# Patient Record
Sex: MALE | Race: WHITE | Employment: OTHER | URBAN - METROPOLITAN AREA
[De-identification: names, ages, dates, MRNs, and addresses within clinical notes are randomized per-mention and may not be internally consistent; named-entity substitution may affect disease eponyms.]

---

## 2017-01-25 DIAGNOSIS — M72.0 CONTRACTURE OF PALMAR FASCIA: Primary | ICD-10-CM

## 2017-01-26 ENCOUNTER — TELEPHONE (OUTPATIENT)
Dept: ORTHOPEDIC SURGERY | Age: 82
End: 2017-01-26

## 2017-02-17 ENCOUNTER — OFFICE VISIT (OUTPATIENT)
Dept: INTERNAL MEDICINE CLINIC | Age: 82
End: 2017-02-17

## 2017-02-17 VITALS
OXYGEN SATURATION: 99 % | WEIGHT: 165 LBS | SYSTOLIC BLOOD PRESSURE: 142 MMHG | BODY MASS INDEX: 25.01 KG/M2 | HEART RATE: 82 BPM | HEIGHT: 68 IN | DIASTOLIC BLOOD PRESSURE: 76 MMHG | TEMPERATURE: 98 F

## 2017-02-17 DIAGNOSIS — E78.2 MIXED HYPERLIPIDEMIA: ICD-10-CM

## 2017-02-17 DIAGNOSIS — I10 ESSENTIAL HYPERTENSION, BENIGN: ICD-10-CM

## 2017-02-17 DIAGNOSIS — Z01.818 PRE-OP EXAMINATION: ICD-10-CM

## 2017-02-17 DIAGNOSIS — M72.0 DUPUYTREN CONTRACTURE: Primary | ICD-10-CM

## 2017-02-17 DIAGNOSIS — I25.10 CORONARY ARTERY DISEASE INVOLVING NATIVE CORONARY ARTERY OF NATIVE HEART WITHOUT ANGINA PECTORIS: ICD-10-CM

## 2017-02-17 DIAGNOSIS — N40.0 BENIGN PROSTATIC HYPERPLASIA, PRESENCE OF LOWER URINARY TRACT SYMPTOMS UNSPECIFIED, UNSPECIFIED MORPHOLOGY: ICD-10-CM

## 2017-02-17 PROCEDURE — 99214 OFFICE O/P EST MOD 30 MIN: CPT | Performed by: NURSE PRACTITIONER

## 2017-02-17 RX ORDER — CARVEDILOL 12.5 MG/1
TABLET ORAL
COMMUNITY
Start: 2016-12-27

## 2017-03-02 ENCOUNTER — HOSPITAL ENCOUNTER (OUTPATIENT)
Dept: SURGERY | Age: 82
Discharge: HOME | End: 2017-03-02
Attending: ORTHOPAEDIC SURGERY | Admitting: ORTHOPAEDIC SURGERY

## 2017-03-02 VITALS
HEART RATE: 60 BPM | TEMPERATURE: 97.4 F | BODY MASS INDEX: 25.01 KG/M2 | SYSTOLIC BLOOD PRESSURE: 128 MMHG | OXYGEN SATURATION: 100 % | HEIGHT: 68 IN | RESPIRATION RATE: 20 BRPM | DIASTOLIC BLOOD PRESSURE: 60 MMHG | WEIGHT: 165 LBS

## 2017-03-02 RX ORDER — SODIUM CHLORIDE 0.9 % (FLUSH) 0.9 %
10 SYRINGE (ML) INJECTION EVERY 12 HOURS SCHEDULED
Status: DISCONTINUED | OUTPATIENT
Start: 2017-03-02 | End: 2017-03-03 | Stop reason: HOSPADM

## 2017-03-02 RX ORDER — HYDROCODONE BITARTRATE AND ACETAMINOPHEN 5; 325 MG/1; MG/1
1 TABLET ORAL EVERY 8 HOURS PRN
Qty: 40 TABLET | Refills: 0 | Status: SHIPPED | OUTPATIENT
Start: 2017-03-02 | End: 2017-03-09

## 2017-03-02 RX ORDER — SODIUM CHLORIDE 0.9 % (FLUSH) 0.9 %
10 SYRINGE (ML) INJECTION PRN
Status: DISCONTINUED | OUTPATIENT
Start: 2017-03-02 | End: 2017-03-03 | Stop reason: HOSPADM

## 2017-03-02 RX ORDER — LIDOCAINE HYDROCHLORIDE 10 MG/ML
1 INJECTION, SOLUTION EPIDURAL; INFILTRATION; INTRACAUDAL; PERINEURAL
Status: ACTIVE | OUTPATIENT
Start: 2017-03-02 | End: 2017-03-02

## 2017-03-02 RX ORDER — SODIUM CHLORIDE, SODIUM LACTATE, POTASSIUM CHLORIDE, CALCIUM CHLORIDE 600; 310; 30; 20 MG/100ML; MG/100ML; MG/100ML; MG/100ML
INJECTION, SOLUTION INTRAVENOUS CONTINUOUS
Status: DISCONTINUED | OUTPATIENT
Start: 2017-03-02 | End: 2017-03-03 | Stop reason: HOSPADM

## 2017-03-02 RX ORDER — SODIUM CHLORIDE 9 MG/ML
INJECTION, SOLUTION INTRAVENOUS
Status: DISPENSED
Start: 2017-03-02 | End: 2017-03-02

## 2017-03-02 RX ADMIN — SODIUM CHLORIDE, SODIUM LACTATE, POTASSIUM CHLORIDE, CALCIUM CHLORIDE: 600; 310; 30; 20 INJECTION, SOLUTION INTRAVENOUS at 06:19

## 2017-03-02 ASSESSMENT — PAIN - FUNCTIONAL ASSESSMENT: PAIN_FUNCTIONAL_ASSESSMENT: 0-10

## 2017-03-02 ASSESSMENT — PAIN SCALES - GENERAL
PAINLEVEL_OUTOF10: 0

## 2017-03-06 ENCOUNTER — HOSPITAL ENCOUNTER (OUTPATIENT)
Dept: OCCUPATIONAL THERAPY | Age: 82
Discharge: HOME | End: 2017-02-28
Admitting: ORTHOPAEDIC SURGERY

## 2017-03-06 ENCOUNTER — HOSPITAL ENCOUNTER (OUTPATIENT)
Dept: OCCUPATIONAL THERAPY | Age: 82
Discharge: HOME | End: 2017-03-06
Admitting: ORTHOPAEDIC SURGERY

## 2017-03-10 ENCOUNTER — HOSPITAL ENCOUNTER (OUTPATIENT)
Dept: OCCUPATIONAL THERAPY | Age: 82
Discharge: HOME | End: 2017-03-10
Admitting: ORTHOPAEDIC SURGERY

## 2017-03-14 ENCOUNTER — OFFICE VISIT (OUTPATIENT)
Dept: ORTHOPEDIC SURGERY | Age: 82
End: 2017-03-14

## 2017-03-14 VITALS — BODY MASS INDEX: 25.88 KG/M2 | HEIGHT: 67 IN | WEIGHT: 164.9 LBS

## 2017-03-14 DIAGNOSIS — M72.0 DUPUYTREN'S CONTRACTURE OF LEFT HAND: Primary | ICD-10-CM

## 2017-03-14 PROCEDURE — 99024 POSTOP FOLLOW-UP VISIT: CPT | Performed by: ORTHOPAEDIC SURGERY

## 2017-03-16 ENCOUNTER — HOSPITAL ENCOUNTER (OUTPATIENT)
Dept: OCCUPATIONAL THERAPY | Age: 82
Discharge: HOME | End: 2017-03-16
Admitting: ORTHOPAEDIC SURGERY

## 2017-03-20 ENCOUNTER — TELEPHONE (OUTPATIENT)
Dept: ORTHOPEDIC SURGERY | Age: 82
End: 2017-03-20

## 2017-05-09 ENCOUNTER — OFFICE VISIT (OUTPATIENT)
Dept: ORTHOPEDIC SURGERY | Age: 82
End: 2017-05-09

## 2017-05-09 VITALS — BODY MASS INDEX: 25.88 KG/M2 | WEIGHT: 164.9 LBS | HEIGHT: 67 IN

## 2017-05-09 DIAGNOSIS — M72.0 DUPUYTREN'S CONTRACTURE OF LEFT HAND: Primary | ICD-10-CM

## 2017-05-09 DIAGNOSIS — M72.0 CONTRACTURE OF PALMAR FASCIA: ICD-10-CM

## 2017-05-09 PROCEDURE — 99024 POSTOP FOLLOW-UP VISIT: CPT | Performed by: ORTHOPAEDIC SURGERY

## 2017-09-18 ENCOUNTER — OFFICE VISIT (OUTPATIENT)
Dept: INTERNAL MEDICINE CLINIC | Age: 82
End: 2017-09-18

## 2017-09-18 VITALS
WEIGHT: 166 LBS | BODY MASS INDEX: 25.16 KG/M2 | HEIGHT: 68 IN | SYSTOLIC BLOOD PRESSURE: 146 MMHG | HEART RATE: 84 BPM | TEMPERATURE: 98.4 F | DIASTOLIC BLOOD PRESSURE: 72 MMHG | OXYGEN SATURATION: 98 %

## 2017-09-18 DIAGNOSIS — I25.10 CORONARY ARTERY DISEASE INVOLVING NATIVE CORONARY ARTERY OF NATIVE HEART WITHOUT ANGINA PECTORIS: ICD-10-CM

## 2017-09-18 DIAGNOSIS — Z23 NEED FOR INFLUENZA VACCINATION: ICD-10-CM

## 2017-09-18 DIAGNOSIS — Z00.00 ROUTINE GENERAL MEDICAL EXAMINATION AT A HEALTH CARE FACILITY: Primary | ICD-10-CM

## 2017-09-18 DIAGNOSIS — N40.0 BENIGN PROSTATIC HYPERPLASIA, PRESENCE OF LOWER URINARY TRACT SYMPTOMS UNSPECIFIED, UNSPECIFIED MORPHOLOGY: ICD-10-CM

## 2017-09-18 DIAGNOSIS — I10 ESSENTIAL HYPERTENSION, BENIGN: ICD-10-CM

## 2017-09-18 DIAGNOSIS — E78.2 MIXED HYPERLIPIDEMIA: ICD-10-CM

## 2017-09-18 PROCEDURE — 90662 IIV NO PRSV INCREASED AG IM: CPT | Performed by: NURSE PRACTITIONER

## 2017-09-18 PROCEDURE — G0008 ADMIN INFLUENZA VIRUS VAC: HCPCS | Performed by: NURSE PRACTITIONER

## 2017-09-18 PROCEDURE — G0439 PPPS, SUBSEQ VISIT: HCPCS | Performed by: NURSE PRACTITIONER

## 2017-09-18 ASSESSMENT — PATIENT HEALTH QUESTIONNAIRE - PHQ9: SUM OF ALL RESPONSES TO PHQ QUESTIONS 1-9: 0

## 2017-09-18 ASSESSMENT — ANXIETY QUESTIONNAIRES: GAD7 TOTAL SCORE: 0

## 2017-09-18 ASSESSMENT — LIFESTYLE VARIABLES: HOW OFTEN DO YOU HAVE A DRINK CONTAINING ALCOHOL: 0

## 2017-11-01 ENCOUNTER — HOSPITAL ENCOUNTER (OUTPATIENT)
Dept: OTHER | Age: 82
Discharge: HOME | End: 2017-11-30
Attending: INTERNAL MEDICINE | Admitting: INTERNAL MEDICINE

## 2017-12-01 ENCOUNTER — HOSPITAL ENCOUNTER (OUTPATIENT)
Dept: OTHER | Age: 82
Discharge: HOME | End: 2017-12-31
Attending: INTERNAL MEDICINE | Admitting: INTERNAL MEDICINE

## 2018-01-01 ENCOUNTER — HOSPITAL ENCOUNTER (OUTPATIENT)
Dept: OTHER | Age: 83
Discharge: HOME | End: 2018-01-31
Attending: INTERNAL MEDICINE | Admitting: INTERNAL MEDICINE

## 2018-01-09 ENCOUNTER — TELEPHONE (OUTPATIENT)
Dept: INTERNAL MEDICINE CLINIC | Age: 83
End: 2018-01-09

## 2018-01-09 NOTE — TELEPHONE ENCOUNTER
Patient started having double vision 2 days ago -it only happens when both eyes are open. His fingertips are also tingly on left hand, and right hand only one finger is. he does't complain of dizziness. .no chest or head congestion, no fever, ear pain -no cough -  Please advise  [phone]

## 2018-01-22 ENCOUNTER — OFFICE VISIT (OUTPATIENT)
Dept: INTERNAL MEDICINE CLINIC | Age: 83
End: 2018-01-22

## 2018-01-22 DIAGNOSIS — J10.1 INFLUENZA A: Primary | ICD-10-CM

## 2018-01-22 DIAGNOSIS — H53.2 DIPLOPIA: ICD-10-CM

## 2018-01-22 DIAGNOSIS — H49.23 SIXTH NERVE PALSY OF BOTH EYES: ICD-10-CM

## 2018-01-22 PROCEDURE — 99213 OFFICE O/P EST LOW 20 MIN: CPT | Performed by: NURSE PRACTITIONER

## 2018-01-22 NOTE — PROGRESS NOTES
place, and time. No cranial nerve deficit. Skin: He is not diaphoretic. Psychiatric: He has a normal mood and affect. His behavior is normal. Thought content normal.       Assessment:      1. Influenza A  Resolved, Doing well    2. Diplopia  Cont FU Ophthalmology     3. Sixth nerve palsy of both eyes  Cont FU Ophthalmology          Plan:      See above plan    If any symptoms worsen, call office immediately     Return if symptoms worsen or fail to improve.

## 2018-01-23 VITALS
BODY MASS INDEX: 25.09 KG/M2 | HEART RATE: 75 BPM | DIASTOLIC BLOOD PRESSURE: 82 MMHG | OXYGEN SATURATION: 99 % | SYSTOLIC BLOOD PRESSURE: 138 MMHG | WEIGHT: 165 LBS

## 2018-01-23 ASSESSMENT — ENCOUNTER SYMPTOMS
VOMITING: 0
DIARRHEA: 0
SINUS PRESSURE: 0
SORE THROAT: 0
NAUSEA: 0
COUGH: 0
FACIAL SWELLING: 0

## 2018-02-01 ENCOUNTER — HOSPITAL ENCOUNTER (OUTPATIENT)
Dept: OTHER | Age: 83
Discharge: HOME | End: 2018-02-28
Attending: INTERNAL MEDICINE | Admitting: INTERNAL MEDICINE

## 2018-03-01 ENCOUNTER — HOSPITAL ENCOUNTER (OUTPATIENT)
Dept: OTHER | Age: 83
Discharge: HOME | End: 2018-03-31
Attending: INTERNAL MEDICINE | Admitting: INTERNAL MEDICINE

## 2018-04-01 ENCOUNTER — HOSPITAL ENCOUNTER (OUTPATIENT)
Dept: OTHER | Age: 83
Discharge: HOME | End: 2018-04-30
Attending: INTERNAL MEDICINE | Admitting: INTERNAL MEDICINE

## 2018-05-01 ENCOUNTER — HOSPITAL ENCOUNTER (OUTPATIENT)
Dept: OTHER | Age: 83
Discharge: HOME | End: 2018-05-31
Attending: INTERNAL MEDICINE | Admitting: INTERNAL MEDICINE

## 2018-06-01 ENCOUNTER — HOSPITAL ENCOUNTER (OUTPATIENT)
Dept: OTHER | Age: 83
Discharge: HOME | End: 2018-06-30
Attending: INTERNAL MEDICINE | Admitting: INTERNAL MEDICINE

## 2018-07-01 ENCOUNTER — HOSPITAL ENCOUNTER (OUTPATIENT)
Dept: OTHER | Age: 83
Discharge: HOME | End: 2018-07-01
Attending: INTERNAL MEDICINE | Admitting: INTERNAL MEDICINE

## 2018-08-14 ENCOUNTER — HOSPITAL ENCOUNTER (OUTPATIENT)
Age: 83
Discharge: HOME | End: 2018-08-14
Payer: MEDICARE

## 2018-08-14 PROCEDURE — 9900000038 HC CARDIAC REHAB PHASE 3 - MONTHLY

## 2018-09-12 ENCOUNTER — HOSPITAL ENCOUNTER (OUTPATIENT)
Age: 83
Discharge: HOME | End: 2018-09-12
Payer: MEDICARE

## 2018-09-12 PROCEDURE — 9900000038 HC CARDIAC REHAB PHASE 3 - MONTHLY

## 2018-09-24 ENCOUNTER — OFFICE VISIT (OUTPATIENT)
Dept: INTERNAL MEDICINE CLINIC | Age: 83
End: 2018-09-24
Payer: MEDICARE

## 2018-09-24 VITALS
BODY MASS INDEX: 25.9 KG/M2 | DIASTOLIC BLOOD PRESSURE: 62 MMHG | HEART RATE: 72 BPM | SYSTOLIC BLOOD PRESSURE: 128 MMHG | OXYGEN SATURATION: 98 % | WEIGHT: 165 LBS | HEIGHT: 67 IN

## 2018-09-24 DIAGNOSIS — Z23 NEED FOR PROPHYLACTIC VACCINATION AND INOCULATION AGAINST VARICELLA: ICD-10-CM

## 2018-09-24 DIAGNOSIS — Z00.00 ROUTINE GENERAL MEDICAL EXAMINATION AT A HEALTH CARE FACILITY: Primary | ICD-10-CM

## 2018-09-24 DIAGNOSIS — Z23 NEED FOR PROPHYLACTIC VACCINATION WITH TETANUS-DIPHTHERIA (TD): ICD-10-CM

## 2018-09-24 PROCEDURE — G0439 PPPS, SUBSEQ VISIT: HCPCS | Performed by: NURSE PRACTITIONER

## 2018-09-24 RX ORDER — TETANUS AND DIPHTHERIA TOXOIDS ADSORBED 2; 2 [LF]/.5ML; [LF]/.5ML
0.5 INJECTION INTRAMUSCULAR ONCE
Qty: 0.5 ML | Refills: 0 | Status: SHIPPED | OUTPATIENT
Start: 2018-09-24 | End: 2018-09-24

## 2018-09-24 ASSESSMENT — LIFESTYLE VARIABLES
HOW MANY STANDARD DRINKS CONTAINING ALCOHOL DO YOU HAVE ON A TYPICAL DAY: 0
HOW OFTEN DURING THE LAST YEAR HAVE YOU BEEN UNABLE TO REMEMBER WHAT HAPPENED THE NIGHT BEFORE BECAUSE YOU HAD BEEN DRINKING: 0
AUDIT-C TOTAL SCORE: 0
HOW OFTEN DURING THE LAST YEAR HAVE YOU HAD A FEELING OF GUILT OR REMORSE AFTER DRINKING: 0
HOW OFTEN DO YOU HAVE A DRINK CONTAINING ALCOHOL: 0
HAS A RELATIVE, FRIEND, DOCTOR, OR ANOTHER HEALTH PROFESSIONAL EXPRESSED CONCERN ABOUT YOUR DRINKING OR SUGGESTED YOU CUT DOWN: 0
HOW OFTEN DURING THE LAST YEAR HAVE YOU FOUND THAT YOU WERE NOT ABLE TO STOP DRINKING ONCE YOU HAD STARTED: 0
HOW OFTEN DURING THE LAST YEAR HAVE YOU FAILED TO DO WHAT WAS NORMALLY EXPECTED FROM YOU BECAUSE OF DRINKING: 0
HOW OFTEN DURING THE LAST YEAR HAVE YOU NEEDED AN ALCOHOLIC DRINK FIRST THING IN THE MORNING TO GET YOURSELF GOING AFTER A NIGHT OF HEAVY DRINKING: 0
HOW OFTEN DO YOU HAVE SIX OR MORE DRINKS ON ONE OCCASION: 0
HAVE YOU OR SOMEONE ELSE BEEN INJURED AS A RESULT OF YOUR DRINKING: 0
AUDIT TOTAL SCORE: 0

## 2018-09-24 ASSESSMENT — PATIENT HEALTH QUESTIONNAIRE - PHQ9
SUM OF ALL RESPONSES TO PHQ QUESTIONS 1-9: 0
SUM OF ALL RESPONSES TO PHQ QUESTIONS 1-9: 0

## 2018-09-24 ASSESSMENT — ANXIETY QUESTIONNAIRES: GAD7 TOTAL SCORE: 0

## 2018-09-24 NOTE — PROGRESS NOTES
02/27/90 05/20/05    COLONOSCOPY  7/28/15    polyp    CORONARY ANGIOPLASTY WITH STENT PLACEMENT  2010 2013    2010, 2013 x 2 stents    HAND SURGERY Left 03/02/2017    EXCISE DUPUYTRENS DISEASE LEFT HAND AND LEFT SMALL FINGER    OTHER SURGICAL HISTORY  1955    Anorectal fistula     Family History   Problem Relation Age of Onset    Cancer Mother         unknown     Heart Disease Father     Cancer Brother         Lung       CareTeam (Including outside providers/suppliers regularly involved in providing care):   Patient Care Team:  Nitza Rebolledo MD as PCP - General  MELECIO Morales CNP as PCP - S Attributed Provider  Yonny Lewis as Consulting Physician (Urology)  Diana Pelaez MD as Consulting Physician (Cardiology)  Umesh Tolliver MD as Surgeon (Orthopedic Surgery)    Wt Readings from Last 3 Encounters:   09/24/18 165 lb (74.8 kg)   01/22/18 165 lb (74.8 kg)   01/09/18 165 lb (74.8 kg)     Vitals:    09/24/18 1012   BP: 128/62   Site: Left Upper Arm   Position: Sitting   Cuff Size: Large Adult   Pulse: 72   SpO2: 98%   Weight: 165 lb (74.8 kg)   Height: 5' 7.25\" (1.708 m)     Body mass index is 25.65 kg/m².     General Appearance: alert and oriented to person, place and time, well developed and well- nourished, in no acute distress  Skin: warm and dry, no rash or erythema  Head: normocephalic and atraumatic  Eyes: pupils equal, round, and reactive to light, extraocular eye movements intact, conjunctivae normal  ENT: tympanic membrane, external ear and ear canal normal bilaterally, nose without deformity, nasal mucosa and turbinates normal without polyps  Neck: supple and non-tender without mass, no thyromegaly or thyroid nodules, no cervical lymphadenopathy  Pulmonary/Chest: clear to auscultation bilaterally- no wheezes, rales or rhonchi, normal air movement, no respiratory distress  Cardiovascular: normal rate, regular rhythm, normal S1 and S2, no murmurs, rubs, clicks, or gallops, distal pulses intact, no carotid bruits  Abdomen: soft, non-tender, non-distended, normal bowel sounds, no masses or organomegaly  Extremities: no cyanosis, clubbing or edema  Musculoskeletal: normal range of motion, no joint swelling, deformity or tenderness  Neurologic: reflexes normal and symmetric, no cranial nerve deficit, gait, coordination and speech normal    Patient's complete Health Risk Assessment and screening values have been reviewed and are found in Flowsheets. The following problems were reviewed today and where indicated follow up appointments were made and/or referrals ordered. Positive Risk Factor Screenings with Interventions:     No Positive Risk Factors identified today. Personalized Preventive Plan   Current Health Maintenance Status  Immunization History   Administered Date(s) Administered    Influenza Virus Vaccine 11/21/2008, 10/16/2009, 10/09/2010, 10/11/2011, 10/11/2013    Influenza, High Dose (Fluzone 65 yrs and older) 10/10/2014, 09/01/2015, 10/14/2016, 09/18/2017    Pneumococcal 13-valent Conjugate ([id number]) 09/12/2016    Pneumococcal Polysaccharide ([id number]) 03/26/2001, 08/09/2011    Tdap (Boostrix, Adacel) 07/20/2006    Zoster Live (Zostavax) 01/01/2010        Health Maintenance   Topic Date Due    Shingles Vaccine (1 of 2 - 2 Dose Series) 03/01/2010    DTaP/Tdap/Td vaccine (2 - Td) 07/20/2016    Flu vaccine (1) 09/01/2018    Potassium monitoring  01/09/2019    Creatinine monitoring  01/09/2019    Pneumococcal low/med risk  Completed     Recommendations for Preventive Services Due: see orders and patient instructions/AVS.  .   Recommended screening schedule for the next 5-10 years is provided to the patient in written form: see Patient Instructions/AVS.

## 2018-10-09 ENCOUNTER — HOSPITAL ENCOUNTER (OUTPATIENT)
Age: 83
Discharge: HOME | End: 2018-10-09
Payer: MEDICARE

## 2018-10-09 PROCEDURE — 9900000038 HC CARDIAC REHAB PHASE 3 - MONTHLY

## 2018-10-26 ENCOUNTER — NURSE ONLY (OUTPATIENT)
Dept: INTERNAL MEDICINE CLINIC | Age: 83
End: 2018-10-26
Payer: MEDICARE

## 2018-10-26 DIAGNOSIS — Z23 NEED FOR PROPHYLACTIC VACCINATION AND INOCULATION AGAINST INFLUENZA: Primary | ICD-10-CM

## 2018-10-26 PROCEDURE — 90662 IIV NO PRSV INCREASED AG IM: CPT | Performed by: FAMILY MEDICINE

## 2018-10-26 PROCEDURE — G0008 ADMIN INFLUENZA VIRUS VAC: HCPCS | Performed by: FAMILY MEDICINE

## 2018-11-06 ENCOUNTER — HOSPITAL ENCOUNTER (OUTPATIENT)
Age: 83
Discharge: HOME | End: 2018-11-06
Payer: MEDICARE

## 2018-11-06 PROCEDURE — 9900000038 HC CARDIAC REHAB PHASE 3 - MONTHLY

## 2018-12-11 ENCOUNTER — HOSPITAL ENCOUNTER (OUTPATIENT)
Age: 83
Discharge: HOME | End: 2018-12-11
Payer: MEDICARE

## 2018-12-11 PROCEDURE — 9900000038 HC CARDIAC REHAB PHASE 3 - MONTHLY

## 2019-01-08 ENCOUNTER — HOSPITAL ENCOUNTER (OUTPATIENT)
Age: 84
Discharge: HOME | End: 2019-01-08
Payer: MEDICARE

## 2019-01-08 PROCEDURE — 9900000038 HC CARDIAC REHAB PHASE 3 - MONTHLY

## 2019-02-12 ENCOUNTER — HOSPITAL ENCOUNTER (OUTPATIENT)
Age: 84
Discharge: HOME | End: 2019-02-12
Payer: MEDICARE

## 2019-02-12 PROCEDURE — 9900000038 HC CARDIAC REHAB PHASE 3 - MONTHLY

## 2019-03-05 ENCOUNTER — HOSPITAL ENCOUNTER (OUTPATIENT)
Age: 84
Discharge: HOME | End: 2019-03-05
Payer: MEDICARE

## 2019-03-05 PROCEDURE — 9900000038 HC CARDIAC REHAB PHASE 3 - MONTHLY

## 2019-04-03 ENCOUNTER — HOSPITAL ENCOUNTER (OUTPATIENT)
Age: 84
Discharge: HOME | End: 2019-04-03
Payer: MEDICARE

## 2019-04-03 PROCEDURE — 9900000038 HC CARDIAC REHAB PHASE 3 - MONTHLY

## 2019-04-05 ENCOUNTER — OFFICE VISIT (OUTPATIENT)
Dept: INTERNAL MEDICINE CLINIC | Age: 84
End: 2019-04-05
Payer: MEDICARE

## 2019-04-05 VITALS
HEART RATE: 64 BPM | BODY MASS INDEX: 26.12 KG/M2 | DIASTOLIC BLOOD PRESSURE: 66 MMHG | SYSTOLIC BLOOD PRESSURE: 132 MMHG | WEIGHT: 168 LBS | OXYGEN SATURATION: 98 %

## 2019-04-05 DIAGNOSIS — H25.9 SENILE CATARACT OF LEFT EYE, UNSPECIFIED AGE-RELATED CATARACT TYPE: Primary | ICD-10-CM

## 2019-04-05 DIAGNOSIS — Z01.818 PRE-OP EXAMINATION: ICD-10-CM

## 2019-04-05 DIAGNOSIS — E78.2 MIXED HYPERLIPIDEMIA: ICD-10-CM

## 2019-04-05 DIAGNOSIS — I10 ESSENTIAL HYPERTENSION, BENIGN: ICD-10-CM

## 2019-04-05 DIAGNOSIS — I25.10 CORONARY ARTERY DISEASE INVOLVING NATIVE CORONARY ARTERY OF NATIVE HEART WITHOUT ANGINA PECTORIS: ICD-10-CM

## 2019-04-05 PROCEDURE — 99214 OFFICE O/P EST MOD 30 MIN: CPT | Performed by: NURSE PRACTITIONER

## 2019-04-05 NOTE — PROGRESS NOTES
Preoperative Consultation      Farhat Wu  YOB: 1935    Date of Service:  4/5/2019    Vitals:    04/05/19 1408   BP: 132/66   Site: Left Upper Arm   Position: Sitting   Cuff Size: Large Adult   Pulse: 64   SpO2: 98%   Weight: 168 lb (76.2 kg)      Wt Readings from Last 2 Encounters:   04/05/19 168 lb (76.2 kg)   09/24/18 165 lb (74.8 kg)     BP Readings from Last 3 Encounters:   04/05/19 132/66   09/24/18 128/62   01/23/18 138/82        Chief Complaint   Patient presents with   Sue Ocampo Pre-op Exam     L cataract / Severa Fly: 4-16-19//[phone]     Allergies   Allergen Reactions    Levaquin  [Levofloxacin In D5w]      Leg pain     Outpatient Medications Marked as Taking for the 4/5/19 encounter (Office Visit) with MELECIO Shankar - CNP   Medication Sig Dispense Refill    Multiple Vitamins-Minerals (MULTIVITAMIN PO) Take by mouth daily      Multiple Vitamins-Minerals (PRESERVISION AREDS PO) Take by mouth daily      Calcium Citrate-Vitamin D (CITRACAL + D PO) Take by mouth daily      carvedilol (COREG) 12.5 MG tablet TAKE ONE TABLET BY MOUTH TWICE A DAY WITH FOOD      amLODIPine (NORVASC) 5 MG tablet Take 1 tablet by mouth daily. 30 tablet 0    tadalafil (CIALIS) 10 MG tablet Take 10 mg by mouth every 48 hours Indications: BPH For BPH      aspirin 81 MG tablet Take 1 tablet by mouth daily. 30 tablet     lisinopril (PRINIVIL;ZESTRIL) 20 MG tablet Take 20 mg by mouth daily.  atorvastatin (LIPITOR) 40 MG tablet Take 40 mg by mouth daily.  MULTIPLE VITAMIN MT Take  by mouth. This patient presents to the office today for a preoperative consultation at the request of surgeon, Dr. Joshua Awad, who plans on performing Lt cataract repair on April 16 at [facility]. The current problem began 3 years ago, and symptoms have been worsening with time.   Conservative therapy: No.    Planned anesthesia: Local and MAC   Known anesthesia problems: None   Bleeding risk: No recent or remote history of abnormal bleeding  Personal or FH of DVT/PE: No    Patient objection to receiving blood products: No    Patient Active Problem List   Diagnosis    RBBB    History of PTCA    CAD (coronary artery disease)    Vertigo    Essential hypertension, benign    Mixed hyperlipidemia    Back pain    Malignant hypertension       Past Medical History:   Diagnosis Date    BPH (benign prostatic hyperplasia)     CAD (coronary artery disease)     Colon polyps 1990    hyperplastic    History of PTCA     Hyperlipidemia     Hypertension     Influenza A 01/09/2018    RBBB     Vertigo      Past Surgical History:   Procedure Laterality Date    COLONOSCOPY  02/27/90 05/20/05    COLONOSCOPY  7/28/15    polyp    CORONARY ANGIOPLASTY WITH STENT PLACEMENT  2010 2013 2010, 2013 x 2 stents    HAND SURGERY Left 03/02/2017    EXCISE DUPUYTRENS DISEASE LEFT HAND AND LEFT SMALL FINGER    OTHER SURGICAL HISTORY  1955    Anorectal fistula     Family History   Problem Relation Age of Onset    Cancer Mother         unknown     Heart Disease Father     Cancer Brother         Lung     Social History     Socioeconomic History    Marital status:      Spouse name: Not on file    Number of children: Not on file    Years of education: Not on file    Highest education level: Not on file   Occupational History    Not on file   Social Needs    Financial resource strain: Not on file    Food insecurity:     Worry: Not on file     Inability: Not on file    Transportation needs:     Medical: Not on file     Non-medical: Not on file   Tobacco Use    Smoking status: Never Smoker    Smokeless tobacco: Never Used   Substance and Sexual Activity    Alcohol use: Yes     Comment: rare glass of wine    Drug use: No    Sexual activity: Not on file   Lifestyle    Physical activity:     Days per week: Not on file     Minutes per session: Not on file    Stress: Not on file   Relationships    Social connections: Talks on phone: Not on file     Gets together: Not on file     Attends Jainism service: Not on file     Active member of club or organization: Not on file     Attends meetings of clubs or organizations: Not on file     Relationship status: Not on file    Intimate partner violence:     Fear of current or ex partner: Not on file     Emotionally abused: Not on file     Physically abused: Not on file     Forced sexual activity: Not on file   Other Topics Concern    Not on file   Social History Narrative    Not on file       Review of Systems  All other systems were reviewed and are negative. Physical Exam   Constitutional: He is oriented to person, place, and time. He appears well-developed and well-nourished. No distress. HENT:   Head: Normocephalic and atraumatic. Mouth/Throat: Uvula is midline, oropharynx is clear and moist and mucous membranes are normal.   Eyes: Conjunctivae and EOM are normal. Pupils are equal, round, and reactive to light. Neck: Trachea normal and normal range of motion. Neck supple. No JVD present. Carotid bruit is not present. No mass and no thyromegaly present. Cardiovascular: Normal rate, regular rhythm, normal heart sounds and intact distal pulses. Exam reveals no gallop and no friction rub. No murmur heard. Pulmonary/Chest: Effort normal and breath sounds normal. No respiratory distress. He has no wheezes. He has no rales. Abdominal: Soft. Normal aorta and bowel sounds are normal. He exhibits no distension and no mass. There is no hepatosplenomegaly. No tenderness. Musculoskeletal: He exhibits no edema and no tenderness. Neurological: He is alert and oriented to person, place, and time. He has normal strength. No cranial nerve deficit or sensory deficit. Coordination and gait normal.   Skin: Skin is warm and dry. No rash noted. No erythema. Psychiatric: He has a normal mood and affect.  His behavior is normal.     EKG Interpretation:  Dr Jannet Sun - cardiac clearance, ok to stop ASA 7 days prior. Lab Review not applicable        Assessment:       80 y.o. patient with planned surgery as above. Known risk factors for perioperative complications: Coronary artery disease, Hypertension  Current medications which may produce withdrawal symptoms if withheld perioperatively: none     1. Senile cataract of left eye, unspecified age-related cataract type  Cleared for surgery    2. Pre-op examination  Cleared for surgery    3. Coronary artery disease involving native coronary artery of native heart without angina pectoris  Monitor    4. Essential hypertension, benign  Monitor    5. Mixed hyperlipidemia  Monitor       Plan:     1. Preoperative workup as follows: Cardiac clearance  2. Change in medication regimen before surgery: Take Carvedilol / Lisinopril on morning of surgery with sip of water, and hold all other medications until after surgery, Discontinue ASA 7 days before surgery, Discontinue NSAIDs (Ibuprofen, Advil, Aleve, Motrin) 7 days before surgery, Ok to take Tylenol prior to surgery. 3. Prophylaxis for cardiac events with perioperative beta-blockers: Currently taking  carvedilol  ACC/AHA indications for pre-operative beta-blocker use:    · Vascular surgery with history of postitive stress test  · Intermediate or high risk surgery with history of CAD   · Intermediate or high risk surgery with multiple clinical predictors of CAD- 2 of the following: history of compensated or prior heart failure, history of cerebrovascular disease, DM, or renal insufficiency    Routine administration of higher-dose, long-acting metoprolol in beta-blocker-naïve patients on the day of surgery, and in the absence of dose titration is associated with an overall increase in mortality. Beta-blockers should be started days to weeks prior to surgery and titrated to pulse < 70.  4. Deep vein thrombosis prophylaxis: regimen to be chosen by surgical team  5.  No contraindications to planned surgery            Cleared for surgery

## 2019-04-08 NOTE — PROGRESS NOTES
Glenora Puna    Age 80 y.o.    male    1935    MRN [id number]    Date___________   Arrival Time_____________  OR Time____________Duration____     Procedure(s):  PHACOEMULSIFICATION OF CATARACT LEFT EYE WITH INTRAOCULAR LENS IMPLANT    Surgeon  ________________________________  Cherre Dyana   General   [place]       Phone [phone] (home)       [address]  Cell Work  ______________________________________________________________________________________________________________________________________________________________________________________________________________________________________________________________________________________________________________________________________________________________    PCP__________________________Phone__________________________________      H&P__________________Bringing      Chart              Epic    DOS           Called_______  EKG__________________Bringing      Chart              Epic    DOS           Called_______  LAB__________________ Bringing      Chart              Epic    DOS           Called_______  CardiacClearance _______Bringing      Chart              Epic    DOS           Called_______      Cardiologist________________________ Phone___________________________      ? Gnosticism concerns / Waiver on Chart            [employer]________________  ? Pre-op Instructions Given [place]          _________________________________  ? Directions to Surgery Center                          _________________________________  ? Transportation Home_______________      _________________________________  ?  Crutches/Walker__________________        _________________________________      ________Pre-op Orders   _______Transcribed    _______Wt.  ________Pharmacy          _______SCD  ______VTE     ______Beta Blocker  ________Consent

## 2019-04-11 NOTE — PROGRESS NOTES
Obstructive Sleep Apnea (JASMINE) Screening     Patient:  Kirt Mckeon    YOB: 1935      Medical Record #:  [id number]                     Date:  4/11/2019     1. Are you a loud and/or regular snorer? []  Yes       [x] No    2. Have you been observed to gasp or stop breathing during sleep? []  Yes       [x] No    3. Do you feel tired or groggy upon awakening or do you awaken with a headache?           []  Yes       [x] No    4. Are you often tired or fatigued during the wake time hours? []  Yes       [x] No    5. Do you fall asleep sitting, reading, watching TV or driving? []  Yes       [x] No    6. Do you often have problems with memory or concentration? []  Yes       [x] No    **If patient's score is ? 3 they are considered high risk for JASMINE. Notify the anesthesiologist of the high risk and document in focus note. Note:  If the patient's BMI is more than 35 kg m¯² , has neck circumference > 40 cm, and/or high blood pressure the risk is greater (© American Sleep Apnea Association, 2006).

## 2019-04-16 ENCOUNTER — HOSPITAL ENCOUNTER (OUTPATIENT)
Age: 84
Setting detail: OUTPATIENT SURGERY
Discharge: HOME | End: 2019-04-16
Attending: OPHTHALMOLOGY | Admitting: OPHTHALMOLOGY
Payer: MEDICARE

## 2019-04-16 ENCOUNTER — ANESTHESIA (OUTPATIENT)
Dept: OPERATING ROOM | Age: 84
End: 2019-04-16
Payer: MEDICARE

## 2019-04-16 ENCOUNTER — ANESTHESIA EVENT (OUTPATIENT)
Dept: OPERATING ROOM | Age: 84
End: 2019-04-16
Payer: MEDICARE

## 2019-04-16 VITALS
TEMPERATURE: 97.2 F | DIASTOLIC BLOOD PRESSURE: 66 MMHG | SYSTOLIC BLOOD PRESSURE: 140 MMHG | RESPIRATION RATE: 16 BRPM | BODY MASS INDEX: 25.16 KG/M2 | WEIGHT: 166 LBS | OXYGEN SATURATION: 96 % | HEIGHT: 68 IN | HEART RATE: 48 BPM

## 2019-04-16 VITALS — SYSTOLIC BLOOD PRESSURE: 140 MMHG | OXYGEN SATURATION: 100 % | DIASTOLIC BLOOD PRESSURE: 70 MMHG

## 2019-04-16 PROCEDURE — 3600000013 HC SURGERY LEVEL 3 ADDTL 15MIN: Performed by: OPHTHALMOLOGY

## 2019-04-16 PROCEDURE — 6360000002 HC RX W HCPCS: Performed by: NURSE ANESTHETIST, CERTIFIED REGISTERED

## 2019-04-16 PROCEDURE — 3700000000 HC ANESTHESIA ATTENDED CARE: Performed by: OPHTHALMOLOGY

## 2019-04-16 PROCEDURE — 7100000010 HC PHASE II RECOVERY - FIRST 15 MIN: Performed by: OPHTHALMOLOGY

## 2019-04-16 PROCEDURE — 6360000002 HC RX W HCPCS: Performed by: OPHTHALMOLOGY

## 2019-04-16 PROCEDURE — 2580000003 HC RX 258: Performed by: OPHTHALMOLOGY

## 2019-04-16 PROCEDURE — 2709999900 HC NON-CHARGEABLE SUPPLY: Performed by: OPHTHALMOLOGY

## 2019-04-16 PROCEDURE — V2632 POST CHMBR INTRAOCULAR LENS: HCPCS | Performed by: OPHTHALMOLOGY

## 2019-04-16 PROCEDURE — 6370000000 HC RX 637 (ALT 250 FOR IP): Performed by: OPHTHALMOLOGY

## 2019-04-16 PROCEDURE — 2580000003 HC RX 258: Performed by: ANESTHESIOLOGY

## 2019-04-16 PROCEDURE — 2500000003 HC RX 250 WO HCPCS: Performed by: NURSE ANESTHETIST, CERTIFIED REGISTERED

## 2019-04-16 PROCEDURE — 3600000003 HC SURGERY LEVEL 3 BASE: Performed by: OPHTHALMOLOGY

## 2019-04-16 PROCEDURE — 2500000003 HC RX 250 WO HCPCS: Performed by: OPHTHALMOLOGY

## 2019-04-16 PROCEDURE — 3700000001 HC ADD 15 MINUTES (ANESTHESIA): Performed by: OPHTHALMOLOGY

## 2019-04-16 PROCEDURE — 7100000011 HC PHASE II RECOVERY - ADDTL 15 MIN: Performed by: OPHTHALMOLOGY

## 2019-04-16 DEVICE — ACRYSOF(R) SINGLE-PIECE ACRYLIC FOLDABLE IOL,UV-ABSORBING POSTERIOR CHAMBER LENS (IOL/PC),13.0MM LENGTH, 6.0MM BICONVEX OPTIC, PLANARHAPTICS.
Type: IMPLANTABLE DEVICE | Site: EYE | Status: FUNCTIONAL
Brand: ACRYSOF®

## 2019-04-16 RX ORDER — TOBRAMYCIN AND DEXAMETHASONE 3; 1 MG/ML; MG/ML
SUSPENSION/ DROPS OPHTHALMIC PRN
Status: DISCONTINUED | OUTPATIENT
Start: 2019-04-16 | End: 2019-04-16 | Stop reason: ALTCHOICE

## 2019-04-16 RX ORDER — PROMETHAZINE HYDROCHLORIDE 25 MG/ML
6.25 INJECTION, SOLUTION INTRAMUSCULAR; INTRAVENOUS
Status: DISCONTINUED | OUTPATIENT
Start: 2019-04-16 | End: 2019-04-16 | Stop reason: HOSPADM

## 2019-04-16 RX ORDER — DIPHENHYDRAMINE HYDROCHLORIDE 50 MG/ML
12.5 INJECTION INTRAMUSCULAR; INTRAVENOUS
Status: DISCONTINUED | OUTPATIENT
Start: 2019-04-16 | End: 2019-04-16 | Stop reason: HOSPADM

## 2019-04-16 RX ORDER — MORPHINE SULFATE 2 MG/ML
1 INJECTION, SOLUTION INTRAMUSCULAR; INTRAVENOUS EVERY 5 MIN PRN
Status: DISCONTINUED | OUTPATIENT
Start: 2019-04-16 | End: 2019-04-16 | Stop reason: HOSPADM

## 2019-04-16 RX ORDER — CYCLOPENTOLATE HYDROCHLORIDE 10 MG/ML
1 SOLUTION/ DROPS OPHTHALMIC
Status: COMPLETED | OUTPATIENT
Start: 2019-04-16 | End: 2019-04-16

## 2019-04-16 RX ORDER — OXYCODONE HYDROCHLORIDE AND ACETAMINOPHEN 5; 325 MG/1; MG/1
2 TABLET ORAL PRN
Status: DISCONTINUED | OUTPATIENT
Start: 2019-04-16 | End: 2019-04-16 | Stop reason: HOSPADM

## 2019-04-16 RX ORDER — MEPERIDINE HYDROCHLORIDE 50 MG/ML
12.5 INJECTION INTRAMUSCULAR; INTRAVENOUS; SUBCUTANEOUS EVERY 5 MIN PRN
Status: DISCONTINUED | OUTPATIENT
Start: 2019-04-16 | End: 2019-04-16 | Stop reason: HOSPADM

## 2019-04-16 RX ORDER — LABETALOL HYDROCHLORIDE 5 MG/ML
5 INJECTION, SOLUTION INTRAVENOUS EVERY 10 MIN PRN
Status: DISCONTINUED | OUTPATIENT
Start: 2019-04-16 | End: 2019-04-16 | Stop reason: HOSPADM

## 2019-04-16 RX ORDER — LIDOCAINE HYDROCHLORIDE 10 MG/ML
0.3 INJECTION, SOLUTION EPIDURAL; INFILTRATION; INTRACAUDAL; PERINEURAL
Status: DISCONTINUED | OUTPATIENT
Start: 2019-04-16 | End: 2019-04-16 | Stop reason: HOSPADM

## 2019-04-16 RX ORDER — SODIUM CHLORIDE 0.9 % (FLUSH) 0.9 %
10 SYRINGE (ML) INJECTION PRN
Status: DISCONTINUED | OUTPATIENT
Start: 2019-04-16 | End: 2019-04-16 | Stop reason: HOSPADM

## 2019-04-16 RX ORDER — HYDRALAZINE HYDROCHLORIDE 20 MG/ML
5 INJECTION INTRAMUSCULAR; INTRAVENOUS EVERY 10 MIN PRN
Status: DISCONTINUED | OUTPATIENT
Start: 2019-04-16 | End: 2019-04-16 | Stop reason: HOSPADM

## 2019-04-16 RX ORDER — OXYCODONE HYDROCHLORIDE AND ACETAMINOPHEN 5; 325 MG/1; MG/1
1 TABLET ORAL PRN
Status: DISCONTINUED | OUTPATIENT
Start: 2019-04-16 | End: 2019-04-16 | Stop reason: HOSPADM

## 2019-04-16 RX ORDER — SODIUM CHLORIDE 0.9 % (FLUSH) 0.9 %
10 SYRINGE (ML) INJECTION EVERY 12 HOURS SCHEDULED
Status: DISCONTINUED | OUTPATIENT
Start: 2019-04-16 | End: 2019-04-16 | Stop reason: HOSPADM

## 2019-04-16 RX ORDER — MORPHINE SULFATE 2 MG/ML
2 INJECTION, SOLUTION INTRAMUSCULAR; INTRAVENOUS EVERY 5 MIN PRN
Status: DISCONTINUED | OUTPATIENT
Start: 2019-04-16 | End: 2019-04-16 | Stop reason: HOSPADM

## 2019-04-16 RX ORDER — ONDANSETRON 2 MG/ML
4 INJECTION INTRAMUSCULAR; INTRAVENOUS
Status: DISCONTINUED | OUTPATIENT
Start: 2019-04-16 | End: 2019-04-16 | Stop reason: HOSPADM

## 2019-04-16 RX ORDER — PHENYLEPHRINE HCL 2.5 %
1 DROPS OPHTHALMIC (EYE)
Status: COMPLETED | OUTPATIENT
Start: 2019-04-16 | End: 2019-04-16

## 2019-04-16 RX ORDER — LIDOCAINE HYDROCHLORIDE 20 MG/ML
INJECTION, SOLUTION INFILTRATION; PERINEURAL PRN
Status: DISCONTINUED | OUTPATIENT
Start: 2019-04-16 | End: 2019-04-16 | Stop reason: SDUPTHER

## 2019-04-16 RX ORDER — MAGNESIUM HYDROXIDE 1200 MG/15ML
LIQUID ORAL PRN
Status: DISCONTINUED | OUTPATIENT
Start: 2019-04-16 | End: 2019-04-16 | Stop reason: ALTCHOICE

## 2019-04-16 RX ORDER — SODIUM CHLORIDE, SODIUM LACTATE, POTASSIUM CHLORIDE, CALCIUM CHLORIDE 600; 310; 30; 20 MG/100ML; MG/100ML; MG/100ML; MG/100ML
INJECTION, SOLUTION INTRAVENOUS CONTINUOUS
Status: DISCONTINUED | OUTPATIENT
Start: 2019-04-16 | End: 2019-04-16 | Stop reason: HOSPADM

## 2019-04-16 RX ORDER — PROPOFOL 10 MG/ML
INJECTION, EMULSION INTRAVENOUS PRN
Status: DISCONTINUED | OUTPATIENT
Start: 2019-04-16 | End: 2019-04-16 | Stop reason: SDUPTHER

## 2019-04-16 RX ADMIN — SODIUM CHLORIDE, POTASSIUM CHLORIDE, SODIUM LACTATE AND CALCIUM CHLORIDE: 600; 310; 30; 20 INJECTION, SOLUTION INTRAVENOUS at 08:43

## 2019-04-16 RX ADMIN — BROMFENAC SODIUM 1 DROP: 0.7 SOLUTION/ DROPS OPHTHALMIC at 08:14

## 2019-04-16 RX ADMIN — CYCLOPENTOLATE HYDROCHLORIDE 1 DROP: 10 SOLUTION/ DROPS OPHTHALMIC at 08:14

## 2019-04-16 RX ADMIN — CYCLOPENTOLATE HYDROCHLORIDE 1 DROP: 10 SOLUTION/ DROPS OPHTHALMIC at 08:27

## 2019-04-16 RX ADMIN — PHENYLEPHRINE HYDROCHLORIDE 1 DROP: 25 SOLUTION/ DROPS OPHTHALMIC at 08:15

## 2019-04-16 RX ADMIN — PHENYLEPHRINE HYDROCHLORIDE 1 DROP: 25 SOLUTION/ DROPS OPHTHALMIC at 08:27

## 2019-04-16 RX ADMIN — LIDOCAINE HYDROCHLORIDE 40 MG: 20 INJECTION, SOLUTION INFILTRATION; PERINEURAL at 09:20

## 2019-04-16 RX ADMIN — PROPOFOL 80 MG: 10 INJECTION, EMULSION INTRAVENOUS at 09:20

## 2019-04-16 RX ADMIN — SODIUM CHLORIDE, POTASSIUM CHLORIDE, SODIUM LACTATE AND CALCIUM CHLORIDE: 600; 310; 30; 20 INJECTION, SOLUTION INTRAVENOUS at 09:18

## 2019-04-16 RX ADMIN — PHENYLEPHRINE HYDROCHLORIDE 1 DROP: 25 SOLUTION/ DROPS OPHTHALMIC at 08:41

## 2019-04-16 RX ADMIN — PHENYLEPHRINE HYDROCHLORIDE 1 DROP: 25 SOLUTION/ DROPS OPHTHALMIC at 08:51

## 2019-04-16 RX ADMIN — CYCLOPENTOLATE HYDROCHLORIDE 1 DROP: 10 SOLUTION/ DROPS OPHTHALMIC at 08:40

## 2019-04-16 RX ADMIN — CYCLOPENTOLATE HYDROCHLORIDE 1 DROP: 10 SOLUTION/ DROPS OPHTHALMIC at 08:50

## 2019-04-16 ASSESSMENT — PULMONARY FUNCTION TESTS
PIF_VALUE: 1
PIF_VALUE: 3
PIF_VALUE: 1

## 2019-04-16 ASSESSMENT — PAIN - FUNCTIONAL ASSESSMENT: PAIN_FUNCTIONAL_ASSESSMENT: 0-10

## 2019-04-16 NOTE — ANESTHESIA PRE PROCEDURE
 lidocaine PF 1 % injection 0.3 mL  0.3 mL Intradermal Once PRN Bree Morales MD        lidocaine PF 2 % in hylenex   Intraocular Once Anel Goodwin MD        epinephrine and lidocaine 2% PF in BSS injection (1 mL)   Intraocular Once Anel Goodwin MD           Allergies:     Allergies   Allergen Reactions    Levaquin  [Levofloxacin In D5w]      Leg pain       Problem List:    Patient Active Problem List   Diagnosis Code    RBBB I45.10    History of PTCA Z98.61    CAD (coronary artery disease) I25.10    Vertigo R42    Essential hypertension, benign I10    Mixed hyperlipidemia E78.2    Back pain M54.9    Malignant hypertension I10       Past Medical History:        Diagnosis Date    BPH (benign prostatic hyperplasia)     CAD (coronary artery disease)     Colon polyps 1990    hyperplastic    History of PTCA     Hyperlipidemia     Hypertension     Influenza A 01/09/2018    RBBB     Vertigo     resolved       Past Surgical History:        Procedure Laterality Date    COLONOSCOPY  02/27/90 05/20/05    COLONOSCOPY  7/28/15    polyp    CORONARY ANGIOPLASTY WITH STENT PLACEMENT  2010 2013 2010, 2013 x 2 stents    CYST REMOVAL  1956    back    FINGER SURGERY Right 1946    thumb for infection    HAND SURGERY Left 03/02/2017    EXCISE DUPUYTRENS DISEASE LEFT HAND AND LEFT SMALL FINGER    OTHER SURGICAL HISTORY  1955    Anorectal fistula       Social History:    Social History     Tobacco Use    Smoking status: Never Smoker    Smokeless tobacco: Never Used   Substance Use Topics    Alcohol use: Not Currently     Comment: rare glass of wine                                Counseling given: Not Answered      Vital Signs (Current):   Vitals:    04/11/19 0927 04/16/19 0807   BP:  (!) 149/58   Pulse:  59   Resp:  16   Temp:  97 °F (36.1 °C)   TempSrc:  Temporal   SpO2:  97%   Weight: 166 lb (75.3 kg) 166 lb (75.3 kg)   Height: 5' 8\" (1.727 m) 5' 8\" (1.727 m) BP Readings from Last 3 Encounters:   04/16/19 (!) 149/58   04/05/19 132/66   09/24/18 128/62       NPO Status: Time of last liquid consumption: 0700                        Time of last solid consumption: 1800                        Date of last liquid consumption: 04/16/19(sip with meds)                        Date of last solid food consumption: 04/15/19    BMI:   Wt Readings from Last 3 Encounters:   04/16/19 166 lb (75.3 kg)   04/05/19 168 lb (76.2 kg)   09/24/18 165 lb (74.8 kg)     Body mass index is 25.24 kg/m². CBC:   Lab Results   Component Value Date    WBC 7.9 01/09/2018    RBC 4.59 01/09/2018    HGB 14.0 01/09/2018    HCT 41.1 01/09/2018    MCV 89.5 01/09/2018    RDW 13.2 01/09/2018     01/09/2018       CMP:   Lab Results   Component Value Date     01/09/2018    K 4.2 01/09/2018     01/09/2018    CO2 23 01/09/2018    BUN 15 01/09/2018    CREATININE 1.0 01/09/2018    GFRAA >60 01/09/2018    GFRAA >60 04/06/2013    AGRATIO 1.2 01/09/2018    LABGLOM >60 01/09/2018    GLUCOSE 123 01/09/2018    PROT 6.5 01/09/2018    PROT 7.0 12/20/2012    CALCIUM 7.9 01/09/2018    BILITOT 0.3 01/09/2018    ALKPHOS 58 01/09/2018    AST 25 01/09/2018    ALT 19 01/09/2018       POC Tests: No results for input(s): POCGLU, POCNA, POCK, POCCL, POCBUN, POCHEMO, POCHCT in the last 72 hours.     Coags:   Lab Results   Component Value Date    PROTIME 11.2 03/06/2015    INR 1.04 03/06/2015    APTT 29.0 09/07/2010       HCG (If Applicable): No results found for: PREGTESTUR, PREGSERUM, HCG, HCGQUANT     ABGs: No results found for: PHART, PO2ART, EDT8BVU, JFE6GOA, BEART, Y9LYVNXC     Type & Screen (If Applicable):  No results found for: LABABO, LABRH    Anesthesia Evaluation   no history of anesthetic complications:   Airway: Mallampati: II  TM distance: >3 FB   Neck ROM: limited  Mouth opening: > = 3 FB Dental: normal exam         Pulmonary:Negative Pulmonary ROS Cardiovascular:    (+) hypertension:, CAD:, hyperlipidemia         Beta Blocker:  Dose within 24 Hrs         Neuro/Psych:   (+) neuromuscular disease:,             GI/Hepatic/Renal: Neg GI/Hepatic/Renal ROS            Endo/Other: Negative Endo/Other ROS                    Abdominal:           Vascular:                                     Pre-Operative Diagnosis: Age-related nuclear cataract of left eye [H25.12]    80 y.o.   BMI:  Body mass index is 25.24 kg/m². Vitals:    04/11/19 0927 04/16/19 0807   BP:  (!) 149/58   Pulse:  59   Resp:  16   Temp:  97 °F (36.1 °C)   TempSrc:  Temporal   SpO2:  97%   Weight: 166 lb (75.3 kg) 166 lb (75.3 kg)   Height: 5' 8\" (1.727 m) 5' 8\" (1.727 m)       Allergies   Allergen Reactions    Levaquin  [Levofloxacin In D5w]      Leg pain       Social History     Tobacco Use    Smoking status: Never Smoker    Smokeless tobacco: Never Used   Substance Use Topics    Alcohol use: Not Currently     Comment: rare glass of wine       LABS:    CBC  Lab Results   Component Value Date/Time    WBC 7.9 01/09/2018 12:51 PM    HGB 14.0 01/09/2018 12:51 PM    HCT 41.1 01/09/2018 12:51 PM     (L) 01/09/2018 12:51 PM     RENAL  Lab Results   Component Value Date/Time     01/09/2018 12:51 PM    K 4.2 01/09/2018 12:51 PM     01/09/2018 12:51 PM    CO2 23 01/09/2018 12:51 PM    BUN 15 01/09/2018 12:51 PM    CREATININE 1.0 01/09/2018 12:51 PM    GLUCOSE 123 (H) 01/09/2018 12:51 PM     COAGS  Lab Results   Component Value Date/Time    PROTIME 11.2 03/06/2015 07:43 PM    INR 1.04 03/06/2015 07:43 PM    APTT 29.0 09/07/2010 11:27 AM        Anesthesia Plan      general     ASA 3     (Risks, benefits and alternatives of GA discussed with pt. Questions answered. Willing to proceed.)  Induction: intravenous. Anesthetic plan and risks discussed with patient.                       Comfort Lake MD   4/16/2019

## 2019-04-16 NOTE — H&P
Surgical Service History and Physical    CHIEF COMPLAINT:   Decreased Vision and Glare    Reason for Admission:  Cataract Surgery    History Obtained From:  Patient    HISTORY OF PRESENT ILLNESS:  Pt has noticed painless progressive loss of vision and is experiencing functional difficulty. Past Medical History:        Diagnosis Date    BPH (benign prostatic hyperplasia)     CAD (coronary artery disease)     Colon polyps 1990    hyperplastic    History of PTCA     Hyperlipidemia     Hypertension     Influenza A 01/09/2018    RBBB     Vertigo     resolved       Past Surgical History:        Procedure Laterality Date    COLONOSCOPY  02/27/90 05/20/05    COLONOSCOPY  7/28/15    polyp    CORONARY ANGIOPLASTY WITH STENT PLACEMENT  2010 2013 2010, 2013 x 2 stents    CYST REMOVAL  1956    back    FINGER SURGERY Right 1946    thumb for infection    HAND SURGERY Left 03/02/2017    EXCISE DUPUYTRENS DISEASE LEFT HAND AND LEFT SMALL FINGER    OTHER SURGICAL HISTORY  1955    Anorectal fistula       Allergies:  Levaquin  [levofloxacin in d5w]    Prior to Admission medications    Medication Sig Start Date End Date Taking? Authorizing Provider   Multiple Vitamins-Minerals (MULTIVITAMIN PO) Take by mouth daily    Historical Provider, MD   Multiple Vitamins-Minerals (PRESERVISION AREDS PO) Take by mouth daily    Historical Provider, MD   Calcium Citrate-Vitamin D (CITRACAL + D PO) Take by mouth daily    Historical Provider, MD   carvedilol (COREG) 12.5 MG tablet TAKE ONE TABLET BY MOUTH TWICE A DAY WITH FOOD 12/27/16   Historical Provider, MD   amLODIPine (NORVASC) 5 MG tablet Take 1 tablet by mouth daily. Patient taking differently: Take 5 mg by mouth daily Takes at night 3/8/15   Diana Mantilla MD   tadalafil (CIALIS) 10 MG tablet Take 10 mg by mouth every 48 hours Indications: BPH For BPH    Historical Provider, MD   aspirin 81 MG tablet Take 1 tablet by mouth daily.  4/6/13   Jessica Mari MD lisinopril (PRINIVIL;ZESTRIL) 20 MG tablet Take 20 mg by mouth daily. Historical Provider, MD   atorvastatin (LIPITOR) 40 MG tablet Take 40 mg by mouth daily. Historical Provider, MD   MULTIPLE VITAMIN MT Take  by mouth. Historical Provider, MD         REVIEW OF SYSTEMS:    CONSTITUTIONAL:  negative  EYES: negative  HEENT:  negative  RESPIRATORY:  negative  CARDIOVASCULAR:  negative  MUSCULOSKELETAL:  negative  NEUROLOGICAL:  negative    PHYSICAL EXAM:    VITALS:  Ht 5' 8\" (1.727 m)   Wt 166 lb (75.3 kg)   BMI 25.24 kg/m²   TEMPERATURE:  Current -  ; Max - No data recorded  RESPIRATIONS RANGE: No data recorded  PULSE RANGE: No data recorded  BLOOD PRESSURE RANGE:  No data recorded.  ; No data recorded. CONSTITUTIONAL:  awake, alert, cooperative, no apparent distress, and appears stated age  EYES:  Lids and lashes normal, pupils equal, round and reactive to light, extra ocular muscles intact, sclera clear, conjunctiva normal  ENT:  Normocephalic, without obvious abnormality, atramatic, sinuses nontender on palpation, external ears without lesions, oral pharynx with moist mucus membranes, tonsils without erythema or exudates, gums normal and good dentition. NECK:  Supple, symmetrical, trachea midline, no adenopathy, thyroid symmetric, not enlarged and no tenderness, skin normal  LUNGS:  No increased work of breathing, good air exchange, clear to auscultation bilaterally, no crackles or wheezing  CARDIOVASCULAR:  Normal apical impulse, regular rate and rhythm, normal S1 and S2, no S3 or S4, and no murmur noted  ABDOMEN:  No scars, normal bowel sounds, soft, non-distended, non-tender, no masses palpated, no hepatosplenomegally  MUSCULOSKELETAL:  There is no redness, warmth, or swelling of the joints. Full range of motion noted. Motor strength is 5 out of 5 all extremities bilaterally. Tone is normal.  NEUROLOGIC:  Awake, alert, oriented to name, place and time.   Cranial nerves II-XII are grossly

## 2019-04-16 NOTE — ANESTHESIA POSTPROCEDURE EVALUATION
Department of Anesthesiology  Postprocedure Note    Patient: Annita Rice  MRN: [id number]  YOB: 1935  Date of evaluation: 4/16/2019  Time:  10:29 AM     Procedure Summary     Date:  04/16/19 Room / Location:  [address] [facility] OR    Anesthesia Start:  2106 Anesthesia Stop:  8247    Procedure:  PHACOEMULSIFICATION OF CATARACT LEFT EYE WITH INTRAOCULAR LENS IMPLANT (Left Eye) Diagnosis:       Age-related nuclear cataract of left eye      (Age-related nuclear cataract of left eye [H25.12])    Surgeon:  Efrain Ruano MD Responsible Provider:  Carissa Lamas MD    Anesthesia Type:  general ASA Status:  3          Anesthesia Type: general    Landen Phase I: Landen Score: 10    Landen Phase II: Landen Score: 10    Last vitals: Reviewed and per EMR flowsheets.        Anesthesia Post Evaluation    Patient location during evaluation: PACU  Patient participation: complete - patient participated  Level of consciousness: awake and alert  Airway patency: patent  Nausea & Vomiting: no nausea and no vomiting  Complications: no  Cardiovascular status: blood pressure returned to baseline  Respiratory status: acceptable  Hydration status: euvolemic

## 2019-04-16 NOTE — OP NOTE
OPERATIVE NOTE    Patient Name   Date of Birth Age  MRNSammy Pascual   1935   80 y.o.  [id number]       Surgeon        Surgery Date  Tara Monsivais        4/16/2019       Preoperative Diagnosis: Nuclear Sclerotic Cataract left eye    Postoperative Diagnosis: Nuclear Sclerotic Cataract left eye    Operative Procedure: Phacoemulsification/ Posterior Chamber Intraocular Lens Implantation          Anesthesia:   Peribulbar Block with MAC    Details of Procedure: The patient was brought to the operating room on the operative stretcher in the supine position with the head resting in the head rest.  After appropriate anesthesia monitoring devices were applied, IV sedation was carried out per the anesthesia service. Once sedation was achieved, a peribulbar block was performed, using a 5 mL combination solution containing 4 mL of 2% lidocaine with 1 mL of Vitrase. Approximately 2-3 mL of this solution was administered in a peribulbar fashion through the lower lid of the operative eye. Once appropriate akinesia and anesthesia were demonstrated, the operative eye was prepped and draped in the usual sterile fashion. Tobradex drops and Tetracain drops were administered. A wire lid speculum was then inserted into the operative eye. A paracentesis was then performed using a 15 degree supersharp blade to enter the globe at the limbus, and a .12 mm colibri forceps was used to stabilize the globe. Viscoat was then instilled into the anterior chamber. A temporal clear cornea groove was then created using the 15 degree supersharp blade. The cornea was then entered using the Lucho 2.4 mm clearcut keratome blade. The capsulotomy was then performed using a cystotome, and the capsulorrhexis was completed using uttrata forceps. The nucleus of the cataract was then hydrodissected and hydrodelineated using sterile BSS on a 27 gauge cannula.   The nucleus of the cataract was then removed using the phacoemilsification/aspiration device. This was performed in a bimanual/CHOP technique. The remaining cortex was then removed using the irrigation/aspiration device. The posterior capsule was polished using the I/A device with CAP/VAC settings. The capsular bag was reformed using Provisc. The previously selected Lucho Acrysof +20.0 diopter SA60AT intraocular lens implant was then inserted using the cartridge system. It was spun in place using a Kuglen hook. It was centered and found to be in good, stable position. The remaining viscoelastic was then removed using the I/A device. The corneal incision was then hydrated using sterile BSS on a 30 gauge cannula. It was checked and found to be water-tight. Pilocarpine 2%, followed by Tobradex ophthalmic solutions were then instilled into the operative eye. The wire lid speculum was removed, and a postoperative protective shield was applied. The patient was then transferred to the postanesthesia recovery unit in good stable condition. The patient tolerated the procedure well without complications. A postoperative instruction sheet was given, and the patient will follow up with Dr. Dimple Trujillo office as scheduled.       EBL: none    Specimen: none

## 2019-05-01 ENCOUNTER — HOSPITAL ENCOUNTER (OUTPATIENT)
Age: 84
Discharge: HOME | End: 2019-05-01
Payer: MEDICARE

## 2019-05-01 PROCEDURE — 9900000038 HC CARDIAC REHAB PHASE 3 - MONTHLY

## 2019-06-03 PROCEDURE — 9900000038 HC CARDIAC REHAB PHASE 3 - MONTHLY

## 2019-06-24 ENCOUNTER — HOSPITAL ENCOUNTER (OUTPATIENT)
Dept: CARDIAC REHAB | Age: 84
Discharge: HOME | End: 2019-06-24
Payer: MEDICARE

## 2019-07-03 PROCEDURE — 9900000038 HC CARDIAC REHAB PHASE 3 - MONTHLY

## 2019-07-29 ENCOUNTER — HOSPITAL ENCOUNTER (OUTPATIENT)
Dept: CARDIAC REHAB | Age: 84
Discharge: HOME | End: 2019-07-29
Payer: MEDICARE

## 2019-08-05 ENCOUNTER — OFFICE VISIT (OUTPATIENT)
Dept: INTERNAL MEDICINE CLINIC | Age: 84
End: 2019-08-05
Payer: MEDICARE

## 2019-08-05 VITALS
DIASTOLIC BLOOD PRESSURE: 64 MMHG | BODY MASS INDEX: 24.63 KG/M2 | HEART RATE: 62 BPM | WEIGHT: 162 LBS | OXYGEN SATURATION: 99 % | SYSTOLIC BLOOD PRESSURE: 128 MMHG

## 2019-08-05 DIAGNOSIS — H25.9 SENILE CATARACT OF RIGHT EYE, UNSPECIFIED AGE-RELATED CATARACT TYPE: Primary | ICD-10-CM

## 2019-08-05 DIAGNOSIS — Z98.61 HISTORY OF PTCA: ICD-10-CM

## 2019-08-05 DIAGNOSIS — Z01.818 PRE-OP EXAMINATION: ICD-10-CM

## 2019-08-05 DIAGNOSIS — E78.2 MIXED HYPERLIPIDEMIA: ICD-10-CM

## 2019-08-05 DIAGNOSIS — I25.10 CORONARY ARTERY DISEASE INVOLVING NATIVE CORONARY ARTERY OF NATIVE HEART WITHOUT ANGINA PECTORIS: ICD-10-CM

## 2019-08-05 DIAGNOSIS — I10 MALIGNANT HYPERTENSION: ICD-10-CM

## 2019-08-05 DIAGNOSIS — I45.10 RBBB: ICD-10-CM

## 2019-08-05 PROCEDURE — 9900000038 HC CARDIAC REHAB PHASE 3 - MONTHLY

## 2019-08-05 PROCEDURE — 99214 OFFICE O/P EST MOD 30 MIN: CPT | Performed by: NURSE PRACTITIONER

## 2019-08-05 NOTE — PROGRESS NOTES
Preoperative Consultation      Lisa Beck  YOB: 1935    Date of Service:  8/5/2019    Vitals:    08/05/19 1454   BP: 128/64   Site: Right Upper Arm   Position: Sitting   Cuff Size: Large Adult   Pulse: 62   SpO2: 99%   Weight: 162 lb (73.5 kg)      Wt Readings from Last 2 Encounters:   08/05/19 162 lb (73.5 kg)   04/16/19 166 lb (75.3 kg)     BP Readings from Last 3 Encounters:   08/05/19 128/64   04/16/19 (!) 140/66   04/16/19 (!) 140/70        Chief Complaint   Patient presents with    Pre-op Exam     cataract / 8- / Dr. Chandana Perales /[phone]     Allergies   Allergen Reactions    Levaquin  [Levofloxacin In D5w]      Leg pain     Outpatient Medications Marked as Taking for the 8/5/19 encounter (Office Visit) with Tyler Craft, APRN - CNP   Medication Sig Dispense Refill    Multiple Vitamins-Minerals (MULTIVITAMIN PO) Take by mouth daily      Multiple Vitamins-Minerals (PRESERVISION AREDS PO) Take by mouth daily      Calcium Citrate-Vitamin D (CITRACAL + D PO) Take by mouth daily      carvedilol (COREG) 12.5 MG tablet TAKE ONE TABLET BY MOUTH TWICE A DAY WITH FOOD      amLODIPine (NORVASC) 5 MG tablet Take 1 tablet by mouth daily. (Patient taking differently: Take 5 mg by mouth daily Takes at night) 30 tablet 0    tadalafil (CIALIS) 10 MG tablet Take 10 mg by mouth every 48 hours Indications: BPH For BPH      aspirin 81 MG tablet Take 1 tablet by mouth daily. 30 tablet     lisinopril (PRINIVIL;ZESTRIL) 20 MG tablet Take 20 mg by mouth daily.  atorvastatin (LIPITOR) 40 MG tablet Take 40 mg by mouth daily.  MULTIPLE VITAMIN MT Take  by mouth. This patient presents to the office today for a preoperative consultation at the request of surgeon, Dr. Chandana Perales, who plans on performing cataract surgery on the right eye on August 20 at [facility]. The current problem began several years ago ,and symptoms have been worsening with time.   Conservative therapy: No.    Planned anesthesia: Musculoskeletal: He exhibits no edema and no tenderness. Neurological: He is alert and oriented to person, place, and time. He has normal strength. No cranial nerve deficit or sensory deficit. Coordination and gait normal.   Skin: Skin is warm and dry. No rash noted. No erythema. Psychiatric: He has a normal mood and affect. His behavior is normal.     EKG Interpretation: Not performed in office visit, cardiology visit tomorrow (8/6/19) for clearance. Lab Review not applicable        Assessment:       80 y.o. patient with planned surgery as above. Known risk factors for perioperative complications: None  Current medications which may produce withdrawal symptoms if withheld perioperatively: none     1. Senile cataract of right eye, unspecified age-related cataract type  Cleared for surgery pending cardiac clearance    2. Pre-op examination  Cleared for surgery pending cardiac clearance    3. Malignant hypertension  Monitor    4. RBBB  F/u Cardiology    5. Coronary artery disease involving native coronary artery of native heart without angina pectoris  F/u Cardiology    6. Mixed hyperlipidemia  Monitor    7. History of PTCA       Plan:     1. Preoperative workup as follows: none  2. Change in medication regimen before surgery: Do not take aspirin x 14 days prior to surgery and confirm all other medications with surgical center, do not take ibuprofen, aleve or motrin. Ok to take Tylenol if needed prior to surgery.   3. Prophylaxis for cardiac events with perioperative beta-blockers: Not indicated  ACC/AHA indications for pre-operative beta-blocker use:    · Vascular surgery with history of postitive stress test  · Intermediate or high risk surgery with history of CAD   · Intermediate or high risk surgery with multiple clinical predictors of CAD- 2 of the following: history of compensated or prior heart failure, history of cerebrovascular disease, DM, or renal insufficiency    Routine administration of

## 2019-08-19 ENCOUNTER — ANESTHESIA EVENT (OUTPATIENT)
Dept: OPERATING ROOM | Age: 84
End: 2019-08-19
Payer: MEDICARE

## 2019-08-19 ASSESSMENT — LIFESTYLE VARIABLES: SMOKING_STATUS: 0

## 2019-08-19 NOTE — ANESTHESIA PRE PROCEDURE
Resp:  16   Temp:  97.7 °F (36.5 °C)   TempSrc:  Temporal   SpO2:  97%   Weight: 162 lb (73.5 kg) 162 lb (73.5 kg)   Height: 5' 8\" (1.727 m) 5' 8\" (1.727 m)                                              BP Readings from Last 3 Encounters:   08/20/19 (!) 179/67   08/05/19 128/64   04/16/19 (!) 140/66       NPO Status: Time of last liquid consumption: 2200                        Time of last solid consumption: 2000                        Date of last liquid consumption: 08/19/19                        Date of last solid food consumption: 08/19/19    BMI:   Wt Readings from Last 3 Encounters:   08/20/19 162 lb (73.5 kg)   08/05/19 162 lb (73.5 kg)   04/16/19 166 lb (75.3 kg)     Body mass index is 24.63 kg/m². CBC:   Lab Results   Component Value Date    WBC 7.9 01/09/2018    RBC 4.59 01/09/2018    HGB 14.0 01/09/2018    HCT 41.1 01/09/2018    MCV 89.5 01/09/2018    RDW 13.2 01/09/2018     01/09/2018       CMP:   Lab Results   Component Value Date     01/09/2018    K 4.2 01/09/2018     01/09/2018    CO2 23 01/09/2018    BUN 15 01/09/2018    CREATININE 1.0 01/09/2018    GFRAA >60 01/09/2018    GFRAA >60 04/06/2013    AGRATIO 1.2 01/09/2018    LABGLOM >60 01/09/2018    GLUCOSE 123 01/09/2018    PROT 6.5 01/09/2018    PROT 7.0 12/20/2012    CALCIUM 7.9 01/09/2018    BILITOT 0.3 01/09/2018    ALKPHOS 58 01/09/2018    AST 25 01/09/2018    ALT 19 01/09/2018       POC Tests: No results for input(s): POCGLU, POCNA, POCK, POCCL, POCBUN, POCHEMO, POCHCT in the last 72 hours.     Coags:   Lab Results   Component Value Date    PROTIME 11.2 03/06/2015    INR 1.04 03/06/2015    APTT 29.0 09/07/2010       HCG (If Applicable): No results found for: PREGTESTUR, PREGSERUM, HCG, HCGQUANT     ABGs: No results found for: PHART, PO2ART, SPU9HYL, MNS3QHO, BEART, C8KRILBO     Type & Screen (If Applicable):  No results found for: LABABO, [address]    Anesthesia Evaluation  Patient summary reviewed no history of anesthetic

## 2019-08-20 ENCOUNTER — HOSPITAL ENCOUNTER (OUTPATIENT)
Age: 84
Setting detail: OUTPATIENT SURGERY
Discharge: HOME | End: 2019-08-20
Attending: OPHTHALMOLOGY | Admitting: OPHTHALMOLOGY
Payer: MEDICARE

## 2019-08-20 ENCOUNTER — ANESTHESIA (OUTPATIENT)
Dept: OPERATING ROOM | Age: 84
End: 2019-08-20
Payer: MEDICARE

## 2019-08-20 VITALS
BODY MASS INDEX: 24.55 KG/M2 | WEIGHT: 162 LBS | OXYGEN SATURATION: 96 % | TEMPERATURE: 97.6 F | DIASTOLIC BLOOD PRESSURE: 59 MMHG | RESPIRATION RATE: 15 BRPM | HEIGHT: 68 IN | HEART RATE: 58 BPM | SYSTOLIC BLOOD PRESSURE: 148 MMHG

## 2019-08-20 VITALS — DIASTOLIC BLOOD PRESSURE: 60 MMHG | SYSTOLIC BLOOD PRESSURE: 129 MMHG | OXYGEN SATURATION: 97 %

## 2019-08-20 PROCEDURE — V2632 POST CHMBR INTRAOCULAR LENS: HCPCS | Performed by: OPHTHALMOLOGY

## 2019-08-20 PROCEDURE — 3600000003 HC SURGERY LEVEL 3 BASE: Performed by: OPHTHALMOLOGY

## 2019-08-20 PROCEDURE — 2709999900 HC NON-CHARGEABLE SUPPLY: Performed by: OPHTHALMOLOGY

## 2019-08-20 PROCEDURE — 3600000013 HC SURGERY LEVEL 3 ADDTL 15MIN: Performed by: OPHTHALMOLOGY

## 2019-08-20 PROCEDURE — 7100000011 HC PHASE II RECOVERY - ADDTL 15 MIN: Performed by: OPHTHALMOLOGY

## 2019-08-20 PROCEDURE — 7100000010 HC PHASE II RECOVERY - FIRST 15 MIN: Performed by: OPHTHALMOLOGY

## 2019-08-20 PROCEDURE — 2500000003 HC RX 250 WO HCPCS: Performed by: OPHTHALMOLOGY

## 2019-08-20 PROCEDURE — 2500000003 HC RX 250 WO HCPCS: Performed by: NURSE ANESTHETIST, CERTIFIED REGISTERED

## 2019-08-20 PROCEDURE — 2580000003 HC RX 258: Performed by: ANESTHESIOLOGY

## 2019-08-20 PROCEDURE — 6360000002 HC RX W HCPCS: Performed by: NURSE ANESTHETIST, CERTIFIED REGISTERED

## 2019-08-20 PROCEDURE — 6360000002 HC RX W HCPCS: Performed by: OPHTHALMOLOGY

## 2019-08-20 PROCEDURE — 3700000000 HC ANESTHESIA ATTENDED CARE: Performed by: OPHTHALMOLOGY

## 2019-08-20 PROCEDURE — 3700000001 HC ADD 15 MINUTES (ANESTHESIA): Performed by: OPHTHALMOLOGY

## 2019-08-20 PROCEDURE — 6370000000 HC RX 637 (ALT 250 FOR IP): Performed by: OPHTHALMOLOGY

## 2019-08-20 PROCEDURE — 2580000003 HC RX 258: Performed by: OPHTHALMOLOGY

## 2019-08-20 DEVICE — ACRYSOF(R) SINGLE-PIECE ACRYLIC FOLDABLE IOL,UV-ABSORBING POSTERIOR CHAMBER LENS (IOL/PC),13.0MM LENGTH, 6.0MM BICONVEX OPTIC, PLANARHAPTICS.
Type: IMPLANTABLE DEVICE | Site: EYE | Status: FUNCTIONAL
Brand: ACRYSOF®

## 2019-08-20 RX ORDER — TOBRAMYCIN AND DEXAMETHASONE 3; 1 MG/ML; MG/ML
SUSPENSION/ DROPS OPHTHALMIC PRN
Status: DISCONTINUED | OUTPATIENT
Start: 2019-08-20 | End: 2019-08-20 | Stop reason: ALTCHOICE

## 2019-08-20 RX ORDER — MEPERIDINE HYDROCHLORIDE 50 MG/ML
12.5 INJECTION INTRAMUSCULAR; INTRAVENOUS; SUBCUTANEOUS EVERY 5 MIN PRN
Status: DISCONTINUED | OUTPATIENT
Start: 2019-08-20 | End: 2019-08-20 | Stop reason: HOSPADM

## 2019-08-20 RX ORDER — SODIUM CHLORIDE 0.9 % (FLUSH) 0.9 %
10 SYRINGE (ML) INJECTION EVERY 12 HOURS SCHEDULED
Status: DISCONTINUED | OUTPATIENT
Start: 2019-08-20 | End: 2019-08-20 | Stop reason: HOSPADM

## 2019-08-20 RX ORDER — OXYCODONE HYDROCHLORIDE AND ACETAMINOPHEN 5; 325 MG/1; MG/1
1 TABLET ORAL PRN
Status: DISCONTINUED | OUTPATIENT
Start: 2019-08-20 | End: 2019-08-20 | Stop reason: HOSPADM

## 2019-08-20 RX ORDER — MORPHINE SULFATE 2 MG/ML
2 INJECTION, SOLUTION INTRAMUSCULAR; INTRAVENOUS EVERY 5 MIN PRN
Status: DISCONTINUED | OUTPATIENT
Start: 2019-08-20 | End: 2019-08-20 | Stop reason: HOSPADM

## 2019-08-20 RX ORDER — PROPOFOL 10 MG/ML
INJECTION, EMULSION INTRAVENOUS PRN
Status: DISCONTINUED | OUTPATIENT
Start: 2019-08-20 | End: 2019-08-20 | Stop reason: SDUPTHER

## 2019-08-20 RX ORDER — CYCLOPENTOLATE HYDROCHLORIDE 10 MG/ML
1 SOLUTION/ DROPS OPHTHALMIC EVERY 10 MIN PRN
Status: DISCONTINUED | OUTPATIENT
Start: 2019-08-20 | End: 2019-08-20 | Stop reason: HOSPADM

## 2019-08-20 RX ORDER — FENTANYL CITRATE 50 UG/ML
50 INJECTION, SOLUTION INTRAMUSCULAR; INTRAVENOUS EVERY 5 MIN PRN
Status: DISCONTINUED | OUTPATIENT
Start: 2019-08-20 | End: 2019-08-20 | Stop reason: HOSPADM

## 2019-08-20 RX ORDER — SODIUM CHLORIDE 0.9 % (FLUSH) 0.9 %
10 SYRINGE (ML) INJECTION PRN
Status: DISCONTINUED | OUTPATIENT
Start: 2019-08-20 | End: 2019-08-20 | Stop reason: HOSPADM

## 2019-08-20 RX ORDER — MAGNESIUM HYDROXIDE 1200 MG/15ML
LIQUID ORAL PRN
Status: DISCONTINUED | OUTPATIENT
Start: 2019-08-20 | End: 2019-08-20 | Stop reason: ALTCHOICE

## 2019-08-20 RX ORDER — LIDOCAINE HYDROCHLORIDE 10 MG/ML
1 INJECTION, SOLUTION EPIDURAL; INFILTRATION; INTRACAUDAL; PERINEURAL
Status: DISCONTINUED | OUTPATIENT
Start: 2019-08-20 | End: 2019-08-20 | Stop reason: HOSPADM

## 2019-08-20 RX ORDER — MORPHINE SULFATE 2 MG/ML
1 INJECTION, SOLUTION INTRAMUSCULAR; INTRAVENOUS EVERY 5 MIN PRN
Status: DISCONTINUED | OUTPATIENT
Start: 2019-08-20 | End: 2019-08-20 | Stop reason: HOSPADM

## 2019-08-20 RX ORDER — FENTANYL CITRATE 50 UG/ML
25 INJECTION, SOLUTION INTRAMUSCULAR; INTRAVENOUS EVERY 5 MIN PRN
Status: DISCONTINUED | OUTPATIENT
Start: 2019-08-20 | End: 2019-08-20 | Stop reason: HOSPADM

## 2019-08-20 RX ORDER — SODIUM CHLORIDE, SODIUM LACTATE, POTASSIUM CHLORIDE, CALCIUM CHLORIDE 600; 310; 30; 20 MG/100ML; MG/100ML; MG/100ML; MG/100ML
INJECTION, SOLUTION INTRAVENOUS CONTINUOUS
Status: DISCONTINUED | OUTPATIENT
Start: 2019-08-20 | End: 2019-08-20 | Stop reason: HOSPADM

## 2019-08-20 RX ORDER — OXYCODONE HYDROCHLORIDE AND ACETAMINOPHEN 5; 325 MG/1; MG/1
2 TABLET ORAL PRN
Status: DISCONTINUED | OUTPATIENT
Start: 2019-08-20 | End: 2019-08-20 | Stop reason: HOSPADM

## 2019-08-20 RX ORDER — LIDOCAINE HYDROCHLORIDE 20 MG/ML
INJECTION, SOLUTION INFILTRATION; PERINEURAL PRN
Status: DISCONTINUED | OUTPATIENT
Start: 2019-08-20 | End: 2019-08-20 | Stop reason: SDUPTHER

## 2019-08-20 RX ORDER — ONDANSETRON 2 MG/ML
4 INJECTION INTRAMUSCULAR; INTRAVENOUS
Status: DISCONTINUED | OUTPATIENT
Start: 2019-08-20 | End: 2019-08-20 | Stop reason: HOSPADM

## 2019-08-20 RX ORDER — PHENYLEPHRINE HCL 2.5 %
1 DROPS OPHTHALMIC (EYE) EVERY 10 MIN PRN
Status: DISCONTINUED | OUTPATIENT
Start: 2019-08-20 | End: 2019-08-20 | Stop reason: HOSPADM

## 2019-08-20 RX ADMIN — CYCLOPENTOLATE HYDROCHLORIDE 1 DROP: 10 SOLUTION/ DROPS OPHTHALMIC at 09:58

## 2019-08-20 RX ADMIN — LIDOCAINE HYDROCHLORIDE 50 MG: 20 INJECTION, SOLUTION INFILTRATION; PERINEURAL at 10:33

## 2019-08-20 RX ADMIN — PHENYLEPHRINE HYDROCHLORIDE 1 DROP: 25 SOLUTION/ DROPS OPHTHALMIC at 09:47

## 2019-08-20 RX ADMIN — PHENYLEPHRINE HYDROCHLORIDE 1 DROP: 25 SOLUTION/ DROPS OPHTHALMIC at 09:58

## 2019-08-20 RX ADMIN — PHENYLEPHRINE HYDROCHLORIDE 1 DROP: 25 SOLUTION/ DROPS OPHTHALMIC at 10:08

## 2019-08-20 RX ADMIN — CYCLOPENTOLATE HYDROCHLORIDE 1 DROP: 10 SOLUTION/ DROPS OPHTHALMIC at 09:37

## 2019-08-20 RX ADMIN — BROMFENAC SODIUM 1 DROP: 0.7 SOLUTION/ DROPS OPHTHALMIC at 09:35

## 2019-08-20 RX ADMIN — CYCLOPENTOLATE HYDROCHLORIDE 1 DROP: 10 SOLUTION/ DROPS OPHTHALMIC at 09:47

## 2019-08-20 RX ADMIN — PHENYLEPHRINE HYDROCHLORIDE 1 DROP: 25 SOLUTION/ DROPS OPHTHALMIC at 09:37

## 2019-08-20 RX ADMIN — SODIUM CHLORIDE, POTASSIUM CHLORIDE, SODIUM LACTATE AND CALCIUM CHLORIDE: 600; 310; 30; 20 INJECTION, SOLUTION INTRAVENOUS at 09:50

## 2019-08-20 RX ADMIN — CYCLOPENTOLATE HYDROCHLORIDE 1 DROP: 10 SOLUTION/ DROPS OPHTHALMIC at 10:08

## 2019-08-20 RX ADMIN — PROPOFOL 80 MG: 10 INJECTION, EMULSION INTRAVENOUS at 10:33

## 2019-08-20 ASSESSMENT — PULMONARY FUNCTION TESTS
PIF_VALUE: 0

## 2019-08-20 ASSESSMENT — PAIN - FUNCTIONAL ASSESSMENT: PAIN_FUNCTIONAL_ASSESSMENT: 0-10

## 2019-08-20 ASSESSMENT — PAIN SCALES - GENERAL: PAINLEVEL_OUTOF10: 0

## 2019-08-20 NOTE — H&P
Surgical Service History and Physical    CHIEF COMPLAINT:   Decreased Vision and Glare    Reason for Admission:  Cataract Surgery    History Obtained From:  Patient    HISTORY OF PRESENT ILLNESS:  Pt has noticed painless progressive loss of vision and is experiencing functional difficulty. Past Medical History:        Diagnosis Date    BPH (benign prostatic hyperplasia)     CAD (coronary artery disease)     Colon polyps 1990    hyperplastic    History of PTCA     Hyperlipidemia     Hypertension     Influenza A 01/09/2018    RBBB     Vertigo     resolved       Past Surgical History:        Procedure Laterality Date    COLONOSCOPY  02/27/90 05/20/05    COLONOSCOPY  7/28/15    polyp    CORONARY ANGIOPLASTY WITH STENT PLACEMENT  2010 2013 2010, 2013 x 2 stents    CYST REMOVAL  1956    back    FINGER SURGERY Right 1946    thumb for infection    HAND SURGERY Left 03/02/2017    EXCISE DUPUYTRENS DISEASE LEFT HAND AND LEFT SMALL FINGER    INTRACAPSULAR CATARACT EXTRACTION Left 4/16/2019    PHACOEMULSIFICATION OF CATARACT LEFT EYE WITH INTRAOCULAR LENS IMPLANT performed by Meagan Villanueva MD at [address]    Anorectal fistula       Allergies:  Levaquin  [levofloxacin in d5w]    Prior to Admission medications    Medication Sig Start Date End Date Taking? Authorizing Provider   Multiple Vitamins-Minerals (MULTIVITAMIN PO) Take by mouth daily   Yes Historical Provider, MD   Multiple Vitamins-Minerals (PRESERVISION AREDS PO) Take by mouth daily   Yes Historical Provider, MD   Calcium Citrate-Vitamin D (CITRACAL + D PO) Take by mouth daily   Yes Historical Provider, MD   carvedilol (COREG) 12.5 MG tablet TAKE ONE TABLET BY MOUTH TWICE A DAY WITH FOOD 12/27/16  Yes Historical Provider, MD   lisinopril (PRINIVIL;ZESTRIL) 20 MG tablet Take 20 mg by mouth daily. Yes Historical Provider, MD   atorvastatin (LIPITOR) 40 MG tablet Take 40 mg by mouth daily.    Yes Historical scars, normal bowel sounds, soft, non-distended, non-tender, no masses palpated, no hepatosplenomegally  MUSCULOSKELETAL:  There is no redness, warmth, or swelling of the joints. Full range of motion noted. Motor strength is 5 out of 5 all extremities bilaterally. Tone is normal.  NEUROLOGIC:  Awake, alert, oriented to name, place and time. Cranial nerves II-XII are grossly intact. Motor is 5 out of 5 bilaterally. Cerebellar finger to nose, heel to shin intact. Sensory is intact.   Babinski down going, Romberg negative, and gait is normal.      Impression: Visually Significant Cataract    Plan: [address]

## 2019-08-20 NOTE — ANESTHESIA POSTPROCEDURE EVALUATION
Department of Anesthesiology  Postprocedure Note    Patient: Robert Christianson  MRN: [id number]  YOB: 1935  Date of evaluation: 8/20/2019  Time:  3:13 PM     Procedure Summary     Date:  08/20/19 Room / Location:  [facility] OR 03 / [facility] OR    Anesthesia Start:  1030 Anesthesia Stop:  1106    Procedure:  PHACOEMULSIFICATION OF CATARACT RIGHT EYE WITH INTRAOCULAR LENS IMPLANT (Right Eye) Diagnosis:       Age-related nuclear cataract of right eye      (Age-related nuclear cataract of right eye [H25.11])    Surgeon:  Fredy Lomeli MD Responsible Provider:  Matthieu Lepe MD    Anesthesia Type:  TIVA ASA Status:  2          Anesthesia Type: TIVA    Landen Phase I: Landen Score: 10    Landen Phase II: Landen Score: 10    Last vitals: Reviewed and per EMR flowsheets.    Vitals:    08/14/19 1011 08/20/19 0929 08/20/19 1104   BP:  (!) 179/67 (!) 148/59   Pulse:  58 58   Resp:  16 15   Temp:  97.7 °F (36.5 °C) 97.6 °F (36.4 °C)   TempSrc:  Temporal Temporal   SpO2:  97% 96%   Weight: 162 lb (73.5 kg) 162 lb (73.5 kg)    Height: 5' 8\" (1.727 m) 5' 8\" (1.727 m)        Anesthesia Post Evaluation    Patient location during evaluation: bedside  Patient participation: complete - patient participated  Level of consciousness: awake and alert  Airway patency: patent  Nausea & Vomiting: no nausea  Complications: no  Cardiovascular status: hemodynamically stable  Respiratory status: acceptable  Hydration status: euvolemic

## 2019-08-20 NOTE — OP NOTE
OPERATIVE NOTE    Patient Name   Date of Birth Age  MRN#  Salazar Marcellus   1935   80 y.o.  [id number]       Surgeon        Surgery Date  Alyce Forte        8/20/2019       Preoperative Diagnosis: Nuclear Sclerotic Cataract right eye    Postoperative Diagnosis: Nuclear Sclerotic Cataract right eye    Operative Procedure: Phacoemulsification/ Posterior Chamber Intraocular Lens Implantation          Anesthesia:   Peribulbar Block with MAC    Details of Procedure: The patient was brought to the operating room on the operative stretcher in the supine position with the head resting in the head rest.  After appropriate anesthesia monitoring devices were applied, IV sedation was carried out per the anesthesia service. Once sedation was achieved, a peribulbar block was performed, using a 5 mL combination solution containing 4 mL of 2% lidocaine with 1 mL of Vitrase. Approximately 2-3 mL of this solution was administered in a peribulbar fashion through the lower lid of the operative eye. Once appropriate akinesia and anesthesia were demonstrated, the operative eye was prepped and draped in the usual sterile fashion. Tobradex drops and Tetracain drops were administered. A wire lid speculum was then inserted into the operative eye. A paracentesis was then performed using a 15 degree supersharp blade to enter the globe at the limbus, and a .12 mm colibri forceps was used to stabilize the globe. Viscoat was then instilled into the anterior chamber. A temporal clear cornea groove was then created using the 15 degree supersharp blade. The cornea was then entered using the Lucho 2.4 mm clearcut keratome blade. The capsulotomy was then performed using a cystotome, and the capsulorrhexis was completed using uttrata forceps. The nucleus of the cataract was then hydrodissected and hydrodelineated using sterile BSS on a 27 gauge cannula.   The nucleus of the cataract was then removed using the

## 2019-08-26 ENCOUNTER — HOSPITAL ENCOUNTER (OUTPATIENT)
Dept: CARDIAC REHAB | Age: 84
Discharge: HOME | End: 2019-08-26
Payer: MEDICARE

## 2019-09-04 PROCEDURE — 9900000038 HC CARDIAC REHAB PHASE 3 - MONTHLY

## 2019-09-30 ENCOUNTER — HOSPITAL ENCOUNTER (OUTPATIENT)
Dept: CARDIAC REHAB | Age: 84
Discharge: HOME | End: 2019-09-30
Payer: MEDICARE

## 2019-10-07 PROCEDURE — 9900000038 HC CARDIAC REHAB PHASE 3 - MONTHLY

## 2019-10-08 LAB
A/G RATIO: 1.5
ALBUMIN SERPL-MCNC: 3.8 G/DL
ALP BLD-CCNC: 105 U/L
ALT SERPL-CCNC: 15 U/L
AST SERPL-CCNC: 17 U/L
BILIRUB SERPL-MCNC: 0.3 MG/DL (ref 0.1–1.4)
BILIRUBIN DIRECT: 0.2 MG/DL
BILIRUBIN, INDIRECT: NORMAL
BUN BLDV-MCNC: 15 MG/DL
CALCIUM SERPL-MCNC: 8.5 MG/DL
CHLORIDE BLD-SCNC: 105 MMOL/L
CHOLESTEROL, TOTAL: 117 MG/DL
CHOLESTEROL/HDL RATIO: NORMAL
CO2: 29 MMOL/L
CREAT SERPL-MCNC: 1.14 MG/DL
GFR CALCULATED: NORMAL
GLOBULIN: 2.5
GLUCOSE BLD-MCNC: 93 MG/DL
HDLC SERPL-MCNC: 44 MG/DL (ref 35–70)
LDL CHOLESTEROL CALCULATED: 63 MG/DL (ref 0–160)
POTASSIUM SERPL-SCNC: 4.6 MMOL/L
PROTEIN TOTAL: 6.3 G/DL
SODIUM BLD-SCNC: 139 MMOL/L
TRIGL SERPL-MCNC: 51 MG/DL
VLDLC SERPL CALC-MCNC: NORMAL MG/DL

## 2019-10-18 ENCOUNTER — NURSE ONLY (OUTPATIENT)
Dept: INTERNAL MEDICINE CLINIC | Age: 84
End: 2019-10-18
Payer: MEDICARE

## 2019-10-18 DIAGNOSIS — Z23 NEED FOR IMMUNIZATION AGAINST INFLUENZA: Primary | ICD-10-CM

## 2019-10-18 PROCEDURE — 90653 IIV ADJUVANT VACCINE IM: CPT | Performed by: NURSE PRACTITIONER

## 2019-10-18 PROCEDURE — G0008 ADMIN INFLUENZA VIRUS VAC: HCPCS | Performed by: NURSE PRACTITIONER

## 2019-10-28 ENCOUNTER — HOSPITAL ENCOUNTER (OUTPATIENT)
Dept: CARDIAC REHAB | Age: 84
Discharge: HOME | End: 2019-10-28
Payer: MEDICARE

## 2019-11-04 PROCEDURE — 9900000038 HC CARDIAC REHAB PHASE 3 - MONTHLY

## 2019-11-22 ENCOUNTER — OFFICE VISIT (OUTPATIENT)
Dept: INTERNAL MEDICINE CLINIC | Age: 84
End: 2019-11-22
Payer: MEDICARE

## 2019-11-22 VITALS
SYSTOLIC BLOOD PRESSURE: 138 MMHG | HEART RATE: 76 BPM | HEIGHT: 68 IN | DIASTOLIC BLOOD PRESSURE: 60 MMHG | WEIGHT: 162 LBS | OXYGEN SATURATION: 98 % | BODY MASS INDEX: 24.55 KG/M2

## 2019-11-22 DIAGNOSIS — Z00.00 ROUTINE GENERAL MEDICAL EXAMINATION AT A HEALTH CARE FACILITY: Primary | ICD-10-CM

## 2019-11-22 PROCEDURE — G0439 PPPS, SUBSEQ VISIT: HCPCS | Performed by: NURSE PRACTITIONER

## 2019-11-22 ASSESSMENT — LIFESTYLE VARIABLES
AUDIT-C TOTAL SCORE: 1
HOW OFTEN DURING THE LAST YEAR HAVE YOU NEEDED AN ALCOHOLIC DRINK FIRST THING IN THE MORNING TO GET YOURSELF GOING AFTER A NIGHT OF HEAVY DRINKING: 0
HOW MANY STANDARD DRINKS CONTAINING ALCOHOL DO YOU HAVE ON A TYPICAL DAY: 0
HOW OFTEN DURING THE LAST YEAR HAVE YOU FAILED TO DO WHAT WAS NORMALLY EXPECTED FROM YOU BECAUSE OF DRINKING: 0
AUDIT TOTAL SCORE: 1
HAVE YOU OR SOMEONE ELSE BEEN INJURED AS A RESULT OF YOUR DRINKING: 0
HOW OFTEN DURING THE LAST YEAR HAVE YOU BEEN UNABLE TO REMEMBER WHAT HAPPENED THE NIGHT BEFORE BECAUSE YOU HAD BEEN DRINKING: 0
HAS A RELATIVE, FRIEND, DOCTOR, OR ANOTHER HEALTH PROFESSIONAL EXPRESSED CONCERN ABOUT YOUR DRINKING OR SUGGESTED YOU CUT DOWN: 0
HOW OFTEN DURING THE LAST YEAR HAVE YOU HAD A FEELING OF GUILT OR REMORSE AFTER DRINKING: 0
HOW OFTEN DURING THE LAST YEAR HAVE YOU FOUND THAT YOU WERE NOT ABLE TO STOP DRINKING ONCE YOU HAD STARTED: 0
HOW OFTEN DO YOU HAVE A DRINK CONTAINING ALCOHOL: 1
HOW OFTEN DO YOU HAVE SIX OR MORE DRINKS ON ONE OCCASION: 0

## 2019-11-22 ASSESSMENT — PATIENT HEALTH QUESTIONNAIRE - PHQ9
SUM OF ALL RESPONSES TO PHQ QUESTIONS 1-9: 0
SUM OF ALL RESPONSES TO PHQ QUESTIONS 1-9: 0

## 2019-11-25 ENCOUNTER — HOSPITAL ENCOUNTER (OUTPATIENT)
Dept: CARDIAC REHAB | Age: 84
Discharge: HOME | End: 2019-11-25
Payer: MEDICARE

## 2019-12-04 PROCEDURE — 9900000038 HC CARDIAC REHAB PHASE 3 - MONTHLY

## 2019-12-30 ENCOUNTER — HOSPITAL ENCOUNTER (OUTPATIENT)
Dept: CARDIAC REHAB | Age: 84
Discharge: HOME | End: 2019-12-30
Payer: MEDICARE

## 2020-01-03 PROCEDURE — 9900000038 HC CARDIAC REHAB PHASE 3 - MONTHLY

## 2020-01-27 ENCOUNTER — HOSPITAL ENCOUNTER (OUTPATIENT)
Dept: CARDIAC REHAB | Age: 85
Discharge: HOME | End: 2020-01-27
Payer: MEDICARE

## 2020-02-11 PROCEDURE — 9900000038 HC CARDIAC REHAB PHASE 3 - MONTHLY

## 2020-02-24 ENCOUNTER — HOSPITAL ENCOUNTER (OUTPATIENT)
Dept: CARDIAC REHAB | Age: 85
Discharge: HOME | End: 2020-02-24
Payer: MEDICARE

## 2020-03-04 PROCEDURE — 9900000038 HC CARDIAC REHAB PHASE 3 - MONTHLY

## 2020-03-30 ENCOUNTER — HOSPITAL ENCOUNTER (OUTPATIENT)
Dept: CARDIAC REHAB | Age: 85
Discharge: HOME | End: 2020-03-30
Payer: MEDICARE

## 2020-07-01 ENCOUNTER — HOSPITAL ENCOUNTER (OUTPATIENT)
Age: 85
Setting detail: OBSERVATION
Discharge: HOME | End: 2020-07-02
Attending: EMERGENCY MEDICINE | Admitting: INTERNAL MEDICINE
Payer: MEDICARE

## 2020-07-01 ENCOUNTER — APPOINTMENT (OUTPATIENT)
Dept: CT IMAGING | Age: 85
End: 2020-07-01
Payer: MEDICARE

## 2020-07-01 ENCOUNTER — APPOINTMENT (OUTPATIENT)
Dept: GENERAL RADIOLOGY | Age: 85
End: 2020-07-01
Payer: MEDICARE

## 2020-07-01 ENCOUNTER — APPOINTMENT (OUTPATIENT)
Dept: MRI IMAGING | Age: 85
End: 2020-07-01
Payer: MEDICARE

## 2020-07-01 PROBLEM — G45.9 TIA (TRANSIENT ISCHEMIC ATTACK): Status: ACTIVE | Noted: 2020-07-01

## 2020-07-01 LAB
A/G RATIO: 1.7 (ref 1.1–2.2)
ALBUMIN SERPL-MCNC: 4.4 G/DL (ref 3.4–5)
ALP BLD-CCNC: 94 U/L (ref 40–129)
ALT SERPL-CCNC: 15 U/L (ref 10–40)
ANION GAP SERPL CALCULATED.3IONS-SCNC: 9 MMOL/L (ref 3–16)
AST SERPL-CCNC: 20 U/L (ref 15–37)
BACTERIA: ABNORMAL /HPF
BASOPHILS ABSOLUTE: 0 K/UL (ref 0–0.2)
BASOPHILS RELATIVE PERCENT: 0.4 %
BILIRUB SERPL-MCNC: 0.4 MG/DL (ref 0–1)
BILIRUBIN URINE: NEGATIVE
BLOOD, URINE: ABNORMAL
BUN BLDV-MCNC: 15 MG/DL (ref 7–20)
CALCIUM SERPL-MCNC: 8.8 MG/DL (ref 8.3–10.6)
CHLORIDE BLD-SCNC: 100 MMOL/L (ref 99–110)
CLARITY: ABNORMAL
CO2: 25 MMOL/L (ref 21–32)
COLOR: YELLOW
CREAT SERPL-MCNC: 1 MG/DL (ref 0.8–1.3)
EKG ATRIAL RATE: 53 BPM
EKG DIAGNOSIS: NORMAL
EKG P AXIS: 53 DEGREES
EKG P-R INTERVAL: 172 MS
EKG Q-T INTERVAL: 436 MS
EKG QRS DURATION: 142 MS
EKG QTC CALCULATION (BAZETT): 409 MS
EKG R AXIS: -18 DEGREES
EKG T AXIS: 18 DEGREES
EKG VENTRICULAR RATE: 53 BPM
EOSINOPHILS ABSOLUTE: 0.1 K/UL (ref 0–0.6)
EOSINOPHILS RELATIVE PERCENT: 1.1 %
GFR AFRICAN AMERICAN: >60
GFR NON-AFRICAN AMERICAN: >60
GLOBULIN: 2.6 G/DL
GLUCOSE BLD-MCNC: 110 MG/DL (ref 70–99)
GLUCOSE BLD-MCNC: 122 MG/DL (ref 70–99)
GLUCOSE URINE: NEGATIVE MG/DL
HCT VFR BLD CALC: 45.4 % (ref 40.5–52.5)
HEMOGLOBIN: 15.4 G/DL (ref 13.5–17.5)
INR BLD: 1.05 (ref 0.86–1.14)
KETONES, URINE: NEGATIVE MG/DL
LEUKOCYTE ESTERASE, URINE: ABNORMAL
LYMPHOCYTES ABSOLUTE: 2.2 K/UL (ref 1–5.1)
LYMPHOCYTES RELATIVE PERCENT: 29.5 %
MCH RBC QN AUTO: 31.3 PG (ref 26–34)
MCHC RBC AUTO-ENTMCNC: 33.9 G/DL (ref 31–36)
MCV RBC AUTO: 92.3 FL (ref 80–100)
MICROSCOPIC EXAMINATION: YES
MONOCYTES ABSOLUTE: 0.7 K/UL (ref 0–1.3)
MONOCYTES RELATIVE PERCENT: 9.5 %
NEUTROPHILS ABSOLUTE: 4.4 K/UL (ref 1.7–7.7)
NEUTROPHILS RELATIVE PERCENT: 59.5 %
NITRITE, URINE: NEGATIVE
PDW BLD-RTO: 12.9 % (ref 12.4–15.4)
PERFORMED ON: ABNORMAL
PH UA: 7 (ref 5–8)
PLATELET # BLD: 162 K/UL (ref 135–450)
PMV BLD AUTO: 7.7 FL (ref 5–10.5)
POTASSIUM SERPL-SCNC: 4.7 MMOL/L (ref 3.5–5.1)
PROTEIN UA: NEGATIVE MG/DL
PROTHROMBIN TIME: 12.2 SEC (ref 10–13.2)
RBC # BLD: 4.92 M/UL (ref 4.2–5.9)
RBC UA: ABNORMAL /HPF (ref 0–4)
SODIUM BLD-SCNC: 134 MMOL/L (ref 136–145)
SPECIFIC GRAVITY UA: 1.01 (ref 1–1.03)
TOTAL PROTEIN: 7 G/DL (ref 6.4–8.2)
TROPONIN: <0.01 NG/ML
URINE TYPE: ABNORMAL
UROBILINOGEN, URINE: 0.2 E.U./DL
WBC # BLD: 7.4 K/UL (ref 4–11)
WBC UA: >100 /HPF (ref 0–5)

## 2020-07-01 PROCEDURE — 36415 COLL VENOUS BLD VENIPUNCTURE: CPT

## 2020-07-01 PROCEDURE — 99285 EMERGENCY DEPT VISIT HI MDM: CPT

## 2020-07-01 PROCEDURE — 87086 URINE CULTURE/COLONY COUNT: CPT

## 2020-07-01 PROCEDURE — G0378 HOSPITAL OBSERVATION PER HR: HCPCS

## 2020-07-01 PROCEDURE — 85610 PROTHROMBIN TIME: CPT

## 2020-07-01 PROCEDURE — 71045 X-RAY EXAM CHEST 1 VIEW: CPT

## 2020-07-01 PROCEDURE — 2580000003 HC RX 258: Performed by: INTERNAL MEDICINE

## 2020-07-01 PROCEDURE — 96365 THER/PROPH/DIAG IV INF INIT: CPT

## 2020-07-01 PROCEDURE — 87077 CULTURE AEROBIC IDENTIFY: CPT

## 2020-07-01 PROCEDURE — 84484 ASSAY OF TROPONIN QUANT: CPT

## 2020-07-01 PROCEDURE — 93005 ELECTROCARDIOGRAM TRACING: CPT | Performed by: EMERGENCY MEDICINE

## 2020-07-01 PROCEDURE — 2580000003 HC RX 258: Performed by: EMERGENCY MEDICINE

## 2020-07-01 PROCEDURE — 6360000002 HC RX W HCPCS: Performed by: EMERGENCY MEDICINE

## 2020-07-01 PROCEDURE — 70551 MRI BRAIN STEM W/O DYE: CPT

## 2020-07-01 PROCEDURE — 70496 CT ANGIOGRAPHY HEAD: CPT

## 2020-07-01 PROCEDURE — 80053 COMPREHEN METABOLIC PANEL: CPT

## 2020-07-01 PROCEDURE — 81001 URINALYSIS AUTO W/SCOPE: CPT

## 2020-07-01 PROCEDURE — 85025 COMPLETE CBC W/AUTO DIFF WBC: CPT

## 2020-07-01 PROCEDURE — 6360000004 HC RX CONTRAST MEDICATION: Performed by: EMERGENCY MEDICINE

## 2020-07-01 PROCEDURE — 6370000000 HC RX 637 (ALT 250 FOR IP): Performed by: INTERNAL MEDICINE

## 2020-07-01 PROCEDURE — 70450 CT HEAD/BRAIN W/O DYE: CPT

## 2020-07-01 PROCEDURE — 87186 SC STD MICRODIL/AGAR DIL: CPT

## 2020-07-01 PROCEDURE — 93010 ELECTROCARDIOGRAM REPORT: CPT | Performed by: INTERNAL MEDICINE

## 2020-07-01 RX ORDER — LABETALOL HYDROCHLORIDE 5 MG/ML
10 INJECTION, SOLUTION INTRAVENOUS EVERY 10 MIN PRN
Status: DISCONTINUED | OUTPATIENT
Start: 2020-07-01 | End: 2020-07-02 | Stop reason: HOSPADM

## 2020-07-01 RX ORDER — ONDANSETRON 2 MG/ML
4 INJECTION INTRAMUSCULAR; INTRAVENOUS EVERY 6 HOURS PRN
Status: DISCONTINUED | OUTPATIENT
Start: 2020-07-01 | End: 2020-07-02 | Stop reason: HOSPADM

## 2020-07-01 RX ORDER — SODIUM CHLORIDE 0.9 % (FLUSH) 0.9 %
10 SYRINGE (ML) INJECTION PRN
Status: DISCONTINUED | OUTPATIENT
Start: 2020-07-01 | End: 2020-07-02 | Stop reason: HOSPADM

## 2020-07-01 RX ORDER — ASPIRIN 300 MG/1
300 SUPPOSITORY RECTAL DAILY
Status: DISCONTINUED | OUTPATIENT
Start: 2020-07-02 | End: 2020-07-02

## 2020-07-01 RX ORDER — ATORVASTATIN CALCIUM 40 MG/1
40 TABLET, FILM COATED ORAL DAILY
Status: DISCONTINUED | OUTPATIENT
Start: 2020-07-01 | End: 2020-07-02 | Stop reason: HOSPADM

## 2020-07-01 RX ORDER — ACETAMINOPHEN 325 MG/1
650 TABLET ORAL EVERY 4 HOURS PRN
Status: DISCONTINUED | OUTPATIENT
Start: 2020-07-01 | End: 2020-07-02 | Stop reason: HOSPADM

## 2020-07-01 RX ORDER — SODIUM CHLORIDE 0.9 % (FLUSH) 0.9 %
10 SYRINGE (ML) INJECTION EVERY 12 HOURS SCHEDULED
Status: DISCONTINUED | OUTPATIENT
Start: 2020-07-01 | End: 2020-07-02 | Stop reason: HOSPADM

## 2020-07-01 RX ORDER — ASPIRIN 81 MG/1
81 TABLET ORAL DAILY
Status: DISCONTINUED | OUTPATIENT
Start: 2020-07-02 | End: 2020-07-02 | Stop reason: HOSPADM

## 2020-07-01 RX ORDER — ASPIRIN 81 MG/1
324 TABLET, CHEWABLE ORAL ONCE
Status: DISCONTINUED | OUTPATIENT
Start: 2020-07-01 | End: 2020-07-01

## 2020-07-01 RX ORDER — ACETAMINOPHEN 650 MG/1
650 SUPPOSITORY RECTAL EVERY 4 HOURS PRN
Status: DISCONTINUED | OUTPATIENT
Start: 2020-07-01 | End: 2020-07-02 | Stop reason: HOSPADM

## 2020-07-01 RX ORDER — POLYETHYLENE GLYCOL 3350 17 G/17G
17 POWDER, FOR SOLUTION ORAL DAILY PRN
Status: DISCONTINUED | OUTPATIENT
Start: 2020-07-01 | End: 2020-07-02 | Stop reason: HOSPADM

## 2020-07-01 RX ORDER — PROMETHAZINE HYDROCHLORIDE 25 MG/1
12.5 TABLET ORAL EVERY 6 HOURS PRN
Status: DISCONTINUED | OUTPATIENT
Start: 2020-07-01 | End: 2020-07-02 | Stop reason: HOSPADM

## 2020-07-01 RX ADMIN — Medication 10 ML: at 20:18

## 2020-07-01 RX ADMIN — CEFTRIAXONE SODIUM 1 G: 1 INJECTION, POWDER, FOR SOLUTION INTRAMUSCULAR; INTRAVENOUS at 13:10

## 2020-07-01 RX ADMIN — ATORVASTATIN CALCIUM 40 MG: 40 TABLET, FILM COATED ORAL at 20:18

## 2020-07-01 RX ADMIN — IOPAMIDOL 75 ML: 755 INJECTION, SOLUTION INTRAVENOUS at 11:44

## 2020-07-01 ASSESSMENT — ENCOUNTER SYMPTOMS
ABDOMINAL PAIN: 0
COUGH: 0
VOMITING: 0
SHORTNESS OF BREATH: 0
COLOR CHANGE: 0
BACK PAIN: 0

## 2020-07-01 NOTE — PROGRESS NOTES
Patient admitted to room 321. Patient alert and oriented x4. Assessment completed as documented. Vital signs stable. Pt on tele SB. Neuro assessment completed and within normal limits. MRI completed. Call light within reach. Denies further needs at this time. Will monitor.

## 2020-07-01 NOTE — ED NOTES
[address] Dr Liz Cardona activated Code Stroke  8221 - called code stroke overhead and called  Stroke team  [phone] - called lab to be on the lookout for pt's labs  1142 - Dr Martina Sigala called back to speak with Dr Monica Miguel  07/01/20 [id number]

## 2020-07-01 NOTE — ED NOTES
[email or web address]  Re: admit.  TIA, UTI per [email or web address]  07/01/20 7215

## 2020-07-01 NOTE — ED NOTES
Patient to CT in [id number] [address], [facility]  07/01/20 1694

## 2020-07-01 NOTE — ED NOTES
Pt ambulates to restroom without difficulty. Pt's gait even and steady. Pt denies SOB, dizziness, or feeling light headed. Pt back to room without difficulty.      Kendra Fong RN  07/01/20 9235

## 2020-07-01 NOTE — ED PROVIDER NOTES
Emergency Department Provider Note  Location: [address] [place] [address]  ED  7/1/2020   Patient Identification  Ronnie Maloney is a 80 y.o. male    Chief Complaint  Tingling (patient reporting left arm and left leg tingling that started at 0700 with c/o feeling light headed ) and Dizziness      Mode of Arrival  private car    HPI  (History provided by patient)  This is a 80 y.o. male with a PMH significant for CAD, HTN, HLD presented today for left arm numbness/tingling. Patient said he woke up feeling normal.  He was getting ready for his routine daily exercise in the morning when he suddenly developed left-sided numbness in both the arm and the leg. He also has some lightheadedness associated with this. Denies room spinning sensation. He denies motor weakness. No slurred speech. No headache. Denies neck pain. No chest pain or shortness of breath. No acute vision changes. ROS  Review of Systems   Constitutional: Negative for chills and fever. HENT: Negative for congestion. Eyes: Negative for visual disturbance. Respiratory: Negative for cough and shortness of breath. Cardiovascular: Negative for chest pain. Gastrointestinal: Negative for abdominal pain and vomiting. Genitourinary: Negative for dysuria, frequency and urgency. Musculoskeletal: Negative for back pain and neck pain. Skin: Negative for color change. Neurological: Positive for numbness. Negative for tremors, seizures, syncope, speech difficulty, weakness and headaches. Psychiatric/Behavioral: Negative for confusion. I have reviewed the following nursing documentation:  Allergies: Allergies   Allergen Reactions    Levaquin  [Levofloxacin In D5w]      Leg pain       Past medical history:  has a past medical history of BPH (benign prostatic hyperplasia), CAD (coronary artery disease), Colon polyps (1990), History of PTCA, Hyperlipidemia, Hypertension, Influenza A (01/09/2018), RBBB, and Vertigo.     Past surgical history:  has a past surgical history that includes Coronary angioplasty with stent (2010 2013); other surgical history (1955); Colonoscopy (02/27/90 05/20/05); Colonoscopy (7/28/15); Hand surgery (Left, 03/02/2017); Finger surgery (Right, 1946); cyst removal (1956); Intracapsular cataract extraction (Left, 4/16/2019); and Intracapsular cataract extraction (Right, 8/20/2019). Home medications:   Prior to Admission medications    Medication Sig Start Date End Date Taking? Authorizing Provider   Multiple Vitamins-Minerals (MULTIVITAMIN PO) Take by mouth daily    Historical Provider, MD   Multiple Vitamins-Minerals (PRESERVISION AREDS PO) Take by mouth daily    Historical Provider, MD   Calcium Citrate-Vitamin D (CITRACAL + D PO) Take by mouth daily    Historical Provider, MD   carvedilol (COREG) 12.5 MG tablet TAKE ONE TABLET BY MOUTH TWICE A DAY WITH FOOD 12/27/16   Historical Provider, MD   amLODIPine (NORVASC) 5 MG tablet Take 1 tablet by mouth daily. Patient taking differently: Take 5 mg by mouth daily Takes at night 3/8/15   Diana Craig MD   tadalafil (CIALIS) 10 MG tablet Take 10 mg by mouth every 48 hours Indications: BPH For BPH    Historical Provider, MD   aspirin 81 MG tablet Take 1 tablet by mouth daily. 4/6/13   Tiesha Drake MD   lisinopril (PRINIVIL;ZESTRIL) 20 MG tablet Take 20 mg by mouth daily. Historical Provider, MD   atorvastatin (LIPITOR) 40 MG tablet Take 40 mg by mouth daily. Historical Provider, MD       Social history:  reports that he has never smoked. He has never used smokeless tobacco. He reports previous alcohol use. He reports that he does not use drugs.     Family history:    Family History   Problem Relation Age of Onset    Cancer Mother         unknown     Heart Disease Father     Heart Attack Father     Cancer Brother         Lung       Exam  ED Triage Vitals   BP Temp Temp Source Pulse Resp SpO2 Height Weight   07/01/20 1126 07/01/20 1126 07/01/20 1126 le=No drift, limb holds 90 (or 45) degrees for full 10 seconds   7. Limb Ataxia: 0=Absent   8. Sensory: 0=Normal; no sensory loss   9. Best Language:  0=No aphasia, normal   10. Dysarthria: 0=Normal   11. Extinction and Inattention: 0=No abnormality   12. Distal motor function: 0=Normal    Total:  0     Of note, patient denies difference in sensation to light touch when I examined him. However he felt something deep internal that is numb/tingling that is different in the left extremities compared to the right. LABS  I have reviewed all labs for this visit.    Results for orders placed or performed during the hospital encounter of 20   CBC auto differential   Result Value Ref Range    WBC 7.4 4.0 - 11.0 K/uL    RBC 4.92 4.20 - 5.90 M/uL    Hemoglobin 15.4 13.5 - 17.5 g/dL    Hematocrit 45.4 40.5 - 52.5 %    MCV 92.3 80.0 - 100.0 fL    MCH 31.3 26.0 - 34.0 pg    MCHC 33.9 31.0 - 36.0 g/dL    RDW 12.9 12.4 - 15.4 %    Platelets 784 862 - 517 K/uL    MPV 7.7 5.0 - 10.5 fL    Neutrophils % 59.5 %    Lymphocytes % 29.5 %    Monocytes % 9.5 %    Eosinophils % 1.1 %    Basophils % 0.4 %    Neutrophils Absolute 4.4 1.7 - 7.7 K/uL    Lymphocytes Absolute 2.2 1.0 - 5.1 K/uL    Monocytes Absolute 0.7 0.0 - 1.3 K/uL    Eosinophils Absolute 0.1 0.0 - 0.6 K/uL    Basophils Absolute 0.0 0.0 - 0.2 K/uL   Comprehensive metabolic panel   Result Value Ref Range    Sodium 134 (L) 136 - 145 mmol/L    Potassium 4.7 3.5 - 5.1 mmol/L    Chloride 100 99 - 110 mmol/L    CO2 25 21 - 32 mmol/L    Anion Gap 9 3 - 16    Glucose 122 (H) 70 - 99 mg/dL    BUN 15 7 - 20 mg/dL    CREATININE 1.0 0.8 - 1.3 mg/dL    GFR Non-African American >60 >60    GFR African American >60 >60    Calcium 8.8 8.3 - 10.6 mg/dL    Total Protein 7.0 6.4 - 8.2 g/dL    Alb 4.4 3.4 - 5.0 g/dL    Albumin/Globulin Ratio 1.7 1.1 - 2.2    Total Bilirubin 0.4 0.0 - 1.0 mg/dL    Alkaline Phosphatase 94 40 - 129 U/L    ALT 15 10 - 40 U/L    AST 20 15 - 37 U/L Globulin 2.6 g/dL   Troponin   Result Value Ref Range    Troponin <0.01 <0.01 ng/mL   Protime-INR   Result Value Ref Range    Protime 12.2 10.0 - 13.2 sec    INR 1.05 0.86 - 1.14   Urinalysis, reflex to microscopic   Result Value Ref Range    Color, UA Yellow Straw/Yellow    Clarity, UA SL CLOUDY (A) Clear    Glucose, Ur Negative Negative mg/dL    Bilirubin Urine Negative Negative    Ketones, Urine Negative Negative mg/dL    Specific Gravity, UA 1.010 1.005 - 1.030    Blood, Urine TRACE-INTACT (A) Negative    pH, UA 7.0 5.0 - 8.0    Protein, UA Negative Negative mg/dL    Urobilinogen, Urine 0.2 <2.0 E.U./dL    Nitrite, Urine Negative Negative    Leukocyte Esterase, Urine LARGE (A) Negative    Microscopic Examination YES     Urine Type Other    Microscopic Urinalysis   Result Value Ref Range    WBC, UA >100 (A) 0 - 5 /HPF    RBC, UA 5-10 (A) 0 - 4 /HPF    Bacteria, UA 2+ (A) None Seen /HPF   POCT Glucose   Result Value Ref Range    POC Glucose 110 (H) 70 - 99 mg/dl    Performed on ACCU-CHEK    EKG 12 Lead   Result Value Ref Range    Ventricular Rate 53 BPM    Atrial Rate 53 BPM    P-R Interval 172 ms    QRS Duration 142 ms    Q-T Interval 436 ms    QTc Calculation (Bazett) 409 ms    P Axis 53 degrees    R Axis -18 degrees    T Axis 18 degrees    Diagnosis       Sinus bradycardiaRight bundle branch blockAbnormal ECGWhen compared with ECG of 09-JAN-2018 14:56,No significant change was found       EKG Interpretation  The Ekg interpreted by me in the absence of a cardiologist shows. sinus bradycardia, rate=53    Axis is   Left axis deviation  QTc is  normal  RBBB -old  No specific ST-T wave changes appreciated. No evidence of acute ischemia. Compared to prior EKG dated January 9, 2018, patient is bradycardic today. But otherwise no significant changes.       RADIOLOGY  Ct Head Wo Contrast    Result Date: 7/1/2020  EXAMINATION: CT OF THE HEAD WITHOUT CONTRAST  7/1/2020 11:38 am TECHNIQUE: CT of the head was performed without the administration of intravenous contrast. Dose modulation, iterative reconstruction, and/or weight based adjustment of the mA/kV was utilized to reduce the radiation dose to as low as reasonably achievable. COMPARISON: None. HISTORY: ORDERING SYSTEM PROVIDED HISTORY: stroke-like symptoms TECHNOLOGIST PROVIDED HISTORY: Has a \"code stroke\" or \"stroke alert\" been called? ->Yes Reason for exam:->stroke-like symptoms Reason for Exam: left sided tingling Acuity: Acute Type of Exam: Initial FINDINGS: BRAIN/VENTRICLES: Ventricles are midline in position. No intracerebral masses are identified. No mass effect or midline shift noted. No acute intracranial hemorrhage is seen. There is intracranial atherosclerosis. . Minimal periventricular hypodensity is seen. ORBITS: The visualized portion of the orbits demonstrate no acute abnormality. SINUSES: Trace mucosal thickening is seen in the ethmoids. SOFT TISSUES/SKULL:  No acute abnormality of the visualized skull or soft tissues. No hemorrhage or mass identified. Minimal periventricular small vessel ischemic change for age. Results discussed with SARABJIT Monroe by Colleen Odom. Garrett Martinez MD at 11:50 am on 7/1/2020     Xr Chest Portable    Result Date: 7/1/2020  EXAMINATION: ONE XRAY VIEW OF THE CHEST 7/1/2020 12:21 pm COMPARISON: Chest x-ray dated 01/09/2018. HISTORY: ORDERING SYSTEM PROVIDED HISTORY: code stroke TECHNOLOGIST PROVIDED HISTORY: Reason for exam:->code stroke Reason for Exam: code stroke Acuity: Unknown Type of Exam: Unknown FINDINGS: HEART/MEDIASTINUM: The cardiomediastinal silhouette is within normal limits. PLEURA/LUNGS: There are no focal consolidations or pleural effusions. There is no appreciable pneumothorax. BONES/SOFT TISSUE: No acute abnormality. No radiographic evidence of acute pulmonary disease.      Cta Head W Con And Cta Neck W Con    Result Date: 7/1/2020  EXAMINATION: CTA OF THE HEAD AND NECK WITH CONTRAST 7/1/2020 11:42 am: TECHNIQUE: disease with maxillary mucous retention cysts. The mastoid air cells are clear. Streak artifact from dental amalgam is noted. Degenerative changes are noted in the spine. There is osseous fusion between the C3 and C4 vertebral bodies. CTA HEAD: ANTERIOR CIRCULATION: Fibrocalcific plaque is noted in the carotid siphons without evidence of a flow-limiting stenosis. The anterior and middle cerebral arteries are unremarkable. POSTERIOR CIRCULATION: The basilar artery and posterior cerebral arteries are unremarkable. OTHER: No dural venous sinus thrombosis on this limited study. BRAIN: No areas of abnormal enhancement or midline shift. Lens implants from cataract surgery are noted bilaterally. 1. Unremarkable CTA of the head and neck. 2. 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 Direct visualization could be performed for further evaluation. TIMES:  Last Known Well: 7am  Arrival: 11:27am  Stroke Team: Case discussed with  Stroke Team, Dr. Viad Kuo at 11:42. Reason for Delays:  [] Social/Tenriism  [] Initial refusal of testing or treatment  [] Care team unable to identify eligibility  [] Hypertension requiring aggressive control with IV medications  [] Further diagnostic evaluation to confirm stroke for patients with hypoglycemia (blood glucose <50), seizures, or major metabolic disorders  [] Management of concomitant emergent/acute conditions such as cardiopulmonary arrest, respiratory failure (requiring intubation)  [] Investigational or experimental protocol for thrombolysis    ED COURSE/MDM  Old records reviewed. Labs and imaging reviewed and results discussed. 80 y.o. male presented for left sided arm and leg numbness that persisted for more than 4.5 hours. While no motor deficit or CN deficit found on exam, his numbness/tingling was concerning for acute stroke. Code Stroke activated. FSBS 110. CT showed no acute hemorrhage or subacute stroke.   When I reevaluated the patient, he Arterial puncture at a noncompressible site in past 7 days  [] Major surgery in past 14 days  [] Gastrointestinal or urinary tract hemorrhage in past 21 days  [] Myocardial infarction in past 3 months  [] Stroke, intracranial surgery or serious head trauma in past 3 months    t-PA given with the following INCLUSION CRITERIA verified (only those checked):  [] Age 25 years or older  [] Clinical diagnosis of ischemic stroke causing measurable neurological deficit  [] Administration of t-PA can be initiated within 3 hours of onset of symptoms  [] A patient or family members who understand the potential risks and benefits:   Of every 100 patients treated with tPA:   72 will have the same outcome   32 will have a better outcome   3 will have a worse outcome (with 1 being severely disabled or fatal) due to t-PA        CLINICAL IMPRESSION  1. Left sided numbness    2. Urinary tract infection without hematuria, site unspecified        Blood pressure (!) 147/65, pulse 55, temperature 97.9 °F (36.6 °C), temperature source Oral, resp. rate 14, height 5' 8\" (1.727 m), weight 168 lb 11.2 oz (76.5 kg), SpO2 100 %. DISPOSITION  Mary Morales was Admit to telemetry in stable condition. CRITICAL CARE TIME:  Total critical care time is 40 minutes, which excludes separately billable procedures and updating family. Time spent is specifically for management of the presenting complaint and symptoms initially, direct bedside care, reevaluation, review of records, and consultation. There was a high probability of clinically significant life-threatening deterioration in the patient's condition, which required my urgent intervention. Gaurang Monge MD    Comment: Please note this report has been produced using speech recognition software and may contain errors related to that system including errors in grammar, punctuation, and spelling, as well as words and phrases that may be inappropriate.  If there are any questions or concerns please feel free to contact the dictating provider for clarification.       Ariel Salas MD  07/01/20 4013

## 2020-07-01 NOTE — H&P
interpretation: Sinus bradycardia  @ 53 bpm with old right bundle branch block. No overt acute ST-T changes    XR CHEST PORTABLE   Final Result   No radiographic evidence of acute pulmonary disease. CTA Head W Con And CTA Neck W Con   Final Result   1. Unremarkable CTA of the head and neck. 2. 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 Direct visualization   could be performed for further evaluation. CT Head WO Contrast   Final Result   No hemorrhage or mass identified. Minimal periventricular small vessel ischemic change for age. Results discussed with SARABJIT Martinez by Yani Jennings. Cathy Linares MD at 11:50 am on   7/1/2020             [facility] Problems    Diagnosis Date Noted    TIA (transient ischemic attack) [G45.9] 07/01/2020     Priority: High    Essential hypertension, benign [I10] 09/17/2012     Priority: Medium    Mixed hyperlipidemia [E78.2] 09/17/2012       TIA/acute CVA : Presented with dizziness, left-sided numbness and tingling now resolved. Elevated BP likely contributing on initial presentation. CT head and CTA head and neck were unremarkable. Admit to telemetry. Continue aspirin and statin. MRI brain cardiac echo, serial neuro checks, PT OT, speech eval , neuro consult, orthostats ordered. Hypertension:  poorly controlled on initial presentation which improved. Hold home BP meds in light of permissive hypertension in for possible TIA/ acute  CVA. PRN IV labetolol per parameters. Hyperlipidemia: Continue statin. Check fasting lipid panel    CAD s/p PCI: pt reported chest pain at home but none in recent days. Already saw his cardiologist with plans for stress test on Monday. Initial trop neg and EKG non acute. Check serial trop. If trop untrending, will consult cardio. Continue aspirin, statin, held BB, ACEI in light of permissive HTN in CVA for now.      UTI : UA abnormal. Continue ceftriaxone started in ER while awaiting urine culture. DVT Prophylaxis: lovenox       Diet: NPO pending bedside swallow eval  Code Status: Full code     PT/OT Eval Status: Ordered      Dispo - 1-2 days pending clinical course       Lisa King MD    Thank you MELECIO Sorenson CNP for the opportunity to be involved in this patient's care. If you have any questions or concerns please feel free to contact me at [phone].

## 2020-07-02 VITALS
RESPIRATION RATE: 16 BRPM | HEIGHT: 68 IN | WEIGHT: 168.7 LBS | BODY MASS INDEX: 25.57 KG/M2 | HEART RATE: 62 BPM | TEMPERATURE: 98.3 F | DIASTOLIC BLOOD PRESSURE: 78 MMHG | OXYGEN SATURATION: 98 % | SYSTOLIC BLOOD PRESSURE: 175 MMHG

## 2020-07-02 PROBLEM — G45.1 TIA INVOLVING RIGHT INTERNAL CAROTID ARTERY: Status: ACTIVE | Noted: 2020-07-01

## 2020-07-02 LAB
ANION GAP SERPL CALCULATED.3IONS-SCNC: 9 MMOL/L (ref 3–16)
BUN BLDV-MCNC: 13 MG/DL (ref 7–20)
CALCIUM SERPL-MCNC: 8.5 MG/DL (ref 8.3–10.6)
CHLORIDE BLD-SCNC: 105 MMOL/L (ref 99–110)
CHOLESTEROL, TOTAL: 95 MG/DL (ref 0–199)
CO2: 23 MMOL/L (ref 21–32)
CREAT SERPL-MCNC: 1 MG/DL (ref 0.8–1.3)
ESTIMATED AVERAGE GLUCOSE: 116.9 MG/DL
GFR AFRICAN AMERICAN: >60
GFR NON-AFRICAN AMERICAN: >60
GLUCOSE BLD-MCNC: 86 MG/DL (ref 70–99)
HBA1C MFR BLD: 5.7 %
HCT VFR BLD CALC: 42.8 % (ref 40.5–52.5)
HDLC SERPL-MCNC: 44 MG/DL (ref 40–60)
HEMOGLOBIN: 14.4 G/DL (ref 13.5–17.5)
LDL CHOLESTEROL CALCULATED: 36 MG/DL
LV EF: 55 %
LVEF MODALITY: NORMAL
MCH RBC QN AUTO: 31.1 PG (ref 26–34)
MCHC RBC AUTO-ENTMCNC: 33.7 G/DL (ref 31–36)
MCV RBC AUTO: 92.3 FL (ref 80–100)
PDW BLD-RTO: 12.3 % (ref 12.4–15.4)
PLATELET # BLD: 144 K/UL (ref 135–450)
PMV BLD AUTO: 8 FL (ref 5–10.5)
POTASSIUM SERPL-SCNC: 4.2 MMOL/L (ref 3.5–5.1)
RBC # BLD: 4.64 M/UL (ref 4.2–5.9)
SODIUM BLD-SCNC: 137 MMOL/L (ref 136–145)
TRIGL SERPL-MCNC: 76 MG/DL (ref 0–150)
VLDLC SERPL CALC-MCNC: 15 MG/DL
WBC # BLD: 7 K/UL (ref 4–11)

## 2020-07-02 PROCEDURE — 97161 PT EVAL LOW COMPLEX 20 MIN: CPT

## 2020-07-02 PROCEDURE — 96372 THER/PROPH/DIAG INJ SC/IM: CPT

## 2020-07-02 PROCEDURE — 80061 LIPID PANEL: CPT

## 2020-07-02 PROCEDURE — G0378 HOSPITAL OBSERVATION PER HR: HCPCS

## 2020-07-02 PROCEDURE — 99219 PR INITIAL OBSERVATION CARE/DAY 50 MINUTES: CPT | Performed by: PSYCHIATRY & NEUROLOGY

## 2020-07-02 PROCEDURE — 36415 COLL VENOUS BLD VENIPUNCTURE: CPT

## 2020-07-02 PROCEDURE — 96366 THER/PROPH/DIAG IV INF ADDON: CPT

## 2020-07-02 PROCEDURE — 6370000000 HC RX 637 (ALT 250 FOR IP): Performed by: INTERNAL MEDICINE

## 2020-07-02 PROCEDURE — 80048 BASIC METABOLIC PNL TOTAL CA: CPT

## 2020-07-02 PROCEDURE — 97116 GAIT TRAINING THERAPY: CPT

## 2020-07-02 PROCEDURE — 6360000002 HC RX W HCPCS: Performed by: NURSE PRACTITIONER

## 2020-07-02 PROCEDURE — 2580000003 HC RX 258

## 2020-07-02 PROCEDURE — 2580000003 HC RX 258: Performed by: NURSE PRACTITIONER

## 2020-07-02 PROCEDURE — 83036 HEMOGLOBIN GLYCOSYLATED A1C: CPT

## 2020-07-02 PROCEDURE — 6360000002 HC RX W HCPCS: Performed by: INTERNAL MEDICINE

## 2020-07-02 PROCEDURE — 92610 EVALUATE SWALLOWING FUNCTION: CPT

## 2020-07-02 PROCEDURE — 85027 COMPLETE CBC AUTOMATED: CPT

## 2020-07-02 PROCEDURE — 93306 TTE W/DOPPLER COMPLETE: CPT

## 2020-07-02 PROCEDURE — 97166 OT EVAL MOD COMPLEX 45 MIN: CPT

## 2020-07-02 PROCEDURE — 2580000003 HC RX 258: Performed by: INTERNAL MEDICINE

## 2020-07-02 PROCEDURE — 6370000000 HC RX 637 (ALT 250 FOR IP): Performed by: NURSE PRACTITIONER

## 2020-07-02 RX ORDER — CEFUROXIME AXETIL 250 MG/1
250 TABLET ORAL 2 TIMES DAILY
Qty: 14 TABLET | Refills: 0 | Status: SHIPPED | OUTPATIENT
Start: 2020-07-02 | End: 2020-07-09

## 2020-07-02 RX ORDER — CARVEDILOL 6.25 MG/1
12.5 TABLET ORAL 2 TIMES DAILY WITH MEALS
Status: DISCONTINUED | OUTPATIENT
Start: 2020-07-02 | End: 2020-07-02 | Stop reason: HOSPADM

## 2020-07-02 RX ORDER — LISINOPRIL 20 MG/1
20 TABLET ORAL DAILY
Status: DISCONTINUED | OUTPATIENT
Start: 2020-07-02 | End: 2020-07-02 | Stop reason: HOSPADM

## 2020-07-02 RX ORDER — SODIUM CHLORIDE 9 MG/ML
INJECTION, SOLUTION INTRAVENOUS
Status: COMPLETED
Start: 2020-07-02 | End: 2020-07-02

## 2020-07-02 RX ADMIN — Medication 10 ML: at 09:33

## 2020-07-02 RX ADMIN — CEFTRIAXONE SODIUM 1 G: 1 INJECTION, POWDER, FOR SOLUTION INTRAMUSCULAR; INTRAVENOUS at 11:24

## 2020-07-02 RX ADMIN — ENOXAPARIN SODIUM 40 MG: 40 INJECTION SUBCUTANEOUS at 09:33

## 2020-07-02 RX ADMIN — SODIUM CHLORIDE 100 ML: 900 INJECTION INTRAVENOUS at 11:25

## 2020-07-02 RX ADMIN — ASPIRIN 81 MG: 81 TABLET ORAL at 09:32

## 2020-07-02 RX ADMIN — CARVEDILOL 12.5 MG: 6.25 TABLET, FILM COATED ORAL at 11:20

## 2020-07-02 RX ADMIN — LISINOPRIL 20 MG: 20 TABLET ORAL at 11:20

## 2020-07-02 NOTE — FLOWSHEET NOTE
07/02/20 0940   Encounter Summary   Services provided to: Patient not available   Referral/Consult From: Rounding   Continue Visiting   (room line busy)   Complexity of Encounter Low   Length of Encounter 15 minutes   Spiritual/Mosque   Intervention Prayer

## 2020-07-02 NOTE — PROGRESS NOTES
and regular solid trials. Pt's RR consistent at 12/min throughout BSE. No overt s/s of aspiration/penetration noted with solid PO trials, thin liquid via cup.  x1 immediate dry throat clear post-swallow with TL via straw. SLP encouraged small, single sips when drinking. Adequate A-P bolus transit observed with solid PO, no oral/lingual residue post-swallow. Recommend continuing pt's Regular solid diet with thin liquids, meds whole with water. Swallow function appears grossly WFL - further ST not indicated at this time, please re-refer ST if changes occur. Treatment Plan  Requires SLP Intervention: No  Duration/Frequency of Treatment: No further ST recommended at this time   D/C Recommendations: Home independently       Recommended Diet and Intervention  Diet Solids Recommendation: Regular  Liquid Consistency Recommendation: Thin  Recommended Form of Meds: PO    Therapeutic Interventions: Patient/Family education    Compensatory Swallowing Strategies  Compensatory Swallowing Strategies: Small bites/sips;Eat/Feed slowly; Remain upright for 30-45 minutes after meals;Upright as possible for all oral intake; Alternate solids and liquids    Treatment/Goals: Further ST not indicated at this time; please re-refer ST if changes occur      General  Chart Reviewed: Yes  Comments: Per MD H&P, \"80 y.o. male with history of hypertension, hyperlipidemia, CAD who presented to [facility] with acute dizziness with left sided numbness and tingling involving his left upper and left lower extremities which started about 7 AM this morning. Denied any slurred speech, dysphagia or focal weakness. Seen in the ER. BP was elevated. Code stroke activated and stroke team determined patient not a TIA candidate due to mild symptoms. CT head and CTA head and neck were unremarkable. Pt reported he took a full dose aspirin before coming to ER.  Pt states he was having non exertional substernal chest pain with  intermittent numbness and tingling in his LLE and LUE which resolves spontaneously for the past several weeks. He had seen his cardiologist, Dr Harmeet Mckeon and is scheduled for an outpatient stress test on Monday. Pt denied any chest pain or SOB today\". Behavior/Cognition: Alert; Cooperative;Pleasant mood  Respiratory Status: Room air  O2 Device: None (Room air)  Communication Observation: Functional  Follows Directions: Simple  Dentition: Adequate  Patient Positioning: Upright in bed  Baseline Vocal Quality: Normal  Volitional Cough: Strong  Prior Dysphagia History: No hx of dysphagia per chart review. Consistencies Administered: Reg solid; Thin - cup; Thin - straw           Vision/Hearing  Vision  Vision: Impaired  Vision Exceptions: Wears glasses for reading;Wears glasses for distance  Hearing  Hearing: Within functional limits    Oral Motor Deficits  Oral/Motor  Oral Motor: Within functional limits    Oral Phase Dysfunction  Oral phase of swallow grossly appears WNL. No oral phase deficits noted during evaluation. Indicators of Pharyngeal Phase Dysfunction   Pharyngeal Phase  Pharyngeal Phase: WFL  Indicators of Pharyngeal Phase Dysfunction  Throat Clearing - Immediate: Thin - straw(x1)  Pharyngeal Phase   Pharyngeal: Pt's RR consistent at 12/min throughout BSE. No overt s/s of aspiration/penetration noted with solid PO trials, thin liquid via cup.  x1 immediate dry throat clear post-swallow with TL via straw. Prognosis  Prognosis  Prognosis for safe diet advancement: good  Barriers to reach goals: age  Individuals consulted  Consulted and agree with results and recommendations: Patient; Family member;RN  Family member consulted: Pt's daughter    Education  Patient Education: Pt educated on reason for referral, role of ST, assessment results and recommendations.   Patient Education Response: Verbalizes understanding  Safety Devices in place: Yes  Type of devices: Left in bed;Bed alarm in place;Call light within reach       Therapy Time  SLP Individual Minutes  Time In: 1000  Time Out: 1010  Minutes: 10; dysphagia john Morales M.S. [id number] [facility]  Speech-language pathologist  [id number]

## 2020-07-02 NOTE — PROGRESS NOTES
Occupational Therapy   Occupational Therapy Initial Assessment 1x  Date: 2020   Patient Name: Rachel Arauz  MRN: [id number]     : 1935    Date of Service: 2020    Discharge Recommendations:  Home with assist PRN     Assessment   Assessment: OT eval completed. Pt admitted for LUE numbness and c/o dizziness. During OT eval, pt demonstrated independent level of function for ADLs and functional mobility in his room. BUE is WNL for strength and motor skills. No c/o LUE numbness. O2 sats and HR stable during ADL activity. No further OT needed. Decision Making: Medium Complexity  OT Education: OT Role  REQUIRES OT FOLLOW UP: No  Activity Tolerance  Activity Tolerance: Patient Tolerated treatment well  Activity Tolerance: HR 65, O2 sats 97% RA  Safety Devices  Safety Devices in place: Yes  Type of devices: Nurse notified;Call light within reach; Left in bed         Patient Diagnosis(es): The primary encounter diagnosis was Left sided numbness. A diagnosis of Urinary tract infection without hematuria, site unspecified was also pertinent to this visit. has a past medical history of BPH (benign prostatic hyperplasia), CAD (coronary artery disease), Colon polyps, History of PTCA, Hyperlipidemia, Hypertension, Influenza A, RBBB, and Vertigo. has a past surgical history that includes Coronary angioplasty with stent (2010); other surgical history (); Colonoscopy (90); Colonoscopy (7/28/15); Hand surgery (Left, 2017); Finger surgery (Right, 194); cyst removal (); Intracapsular cataract extraction (Left, 2019); and Intracapsular cataract extraction (Right, 2019).        Restrictions  Restrictions/Precautions  Restrictions/Precautions: Up as Tolerated  Position Activity Restriction  Other position/activity restrictions: up as tolerated    Subjective   General  Chart Reviewed: Yes, Progress Notes, Imaging, Labs, History and Physical  Patient assessed for rehabilitation patterns     Bed mobility  Supine to Sit: Independent  Sit to Supine: Independent  Transfers  Stand Pivot Transfers: Independent  Sit to stand: Independent  Stand to sit: Independent     Cognition  Overall Cognitive Status: WNL  Perception  Overall Perceptual Status: WFL     Sensation  Overall Sensation Status: WNL      LUE AROM (degrees)  LUE AROM : WNL  RUE AROM (degrees)  RUE AROM : WNL  LUE Strength  Gross LUE Strength:  WNL  RUE Strength  Gross RUE Strength: WNL     AM-PAC Score   AM-PAC Inpatient Daily Activity Raw Score: 24 (07/02/20 0805)  AM-PAC Inpatient ADL T-Scale Score : 57.54 (07/02/20 0805)  ADL Inpatient CMS 0-100% Score: 0 (07/02/20 0805)  ADL Inpatient CMS G-Code Modifier : [address] (07/02/20 0805)    Therapy Time   Individual Concurrent Group Co-treatment   Time In 0745         Time Out 0800         Minutes 15         Timed Code Treatment Minutes: 0 Minutes(OT eval only)     Jimena Arredondo OT

## 2020-07-02 NOTE — PLAN OF CARE
Problem: Nutrition  Intervention: Swallowing evaluation  Note: Bedside swallow evaluation completed this date. Abiola Lehman M.S. [id number] [facility]  Speech-language pathologist  [id number]    Intervention: Aspiration precautions  Note: Bedside swallow evaluation completed this date.     Abiola Lehman M.S. [id number] [facility]  Speech-language pathologist  [id number]

## 2020-07-02 NOTE — PROGRESS NOTES
Physical Therapy    Facility/Department: [facility] C3 TELE/MED SURG/ONC  Initial Assessment and treatment/discharge    NAME: Dwain Olsen  : 1935  MRN: [id number]    Date of Service: 2020    Discharge Recommendations:  Home with assist PRN   PT Equipment Recommendations  Equipment Needed: No    Assessment   Assessment: Patient tolerated session well, transferring and ambulating in room and hallways as above with steady gait without an assistive device. Patient able to perform stair training with modified independence demonstrating reciprocal pattern when ascending and descending. Patient independent with mobility and not in need of skilled PT at this time. Will sign off from acute care PT. Recommend d/c home with PRN assist.    Treatment Diagnosis: impaired mobility associated with TIA  Prognosis: Good  Decision Making: Low Complexity  Patient Education: Educated on role of PT, safety with mobility; patient verbalized understanding. No Skilled PT: Independent with functional mobility   REQUIRES PT FOLLOW UP: No  Activity Tolerance  Activity Tolerance: Patient Tolerated treatment well       Patient Diagnosis(es): The primary encounter diagnosis was Left sided numbness. A diagnosis of Urinary tract infection without hematuria, site unspecified was also pertinent to this visit. has a past medical history of BPH (benign prostatic hyperplasia), CAD (coronary artery disease), Colon polyps, History of PTCA, Hyperlipidemia, Hypertension, Influenza A, RBBB, and Vertigo. has a past surgical history that includes Coronary angioplasty with stent (2010); other surgical history (); Colonoscopy (90); Colonoscopy (7/28/15); Hand surgery (Left, 2017); Finger surgery (Right, 194); cyst removal (); Intracapsular cataract extraction (Left, 2019); and Intracapsular cataract extraction (Right, 2019).     Restrictions  Restrictions/Precautions  Restrictions/Precautions: Up as Tolerated  Position Activity Restriction  Other position/activity restrictions: up as tolerated  Vision/Hearing  Vision: Impaired  Vision Exceptions: Wears glasses for reading;Wears glasses for distance  Hearing: Within functional limits     Subjective  General  Chart Reviewed: Yes  Patient assessed for rehabilitation services?: Yes  Additional Pertinent Hx: Patient is an 79 y/o male admitted 7/1 with left arm/leg numbness/tingling. CT head and MRI brain (-) for acute findings. PMH significant for HTN, HLD, CAD, vertigo, BPH. Response To Previous Treatment: Not applicable  Family / Caregiver Present: Yes(daughter)  Referring Practitioner: Socrates Marie MD  Referral Date : 07/01/20  Diagnosis: TIA  Follows Commands: Within Functional Limits  General Comment  Comments: Patient supine in bed upon arrival.  Subjective  Subjective: Patient agreeable to PT evaluation. Pain Screening  Patient Currently in Pain: Denies  Vital Signs  Patient Currently in Pain: Denies       Orientation  Orientation  Overall Orientation Status: Within Normal Limits  Social/Functional History  Social/Functional History  Lives With: Alone(wife is at [employer])  Type of Home: House  Home Layout: Two level, Bed/Bath upstairs  Home Access: Stairs to enter with rails  Entrance Stairs - Number of Steps: Enters through basement garage and goes up 13 steps to main level.    Entrance Stairs - Rails: Both  Bathroom Shower/Tub: Tub/Shower unit  Bathroom Toilet: Standard  Bathroom Equipment: Grab bars in shower  Home Equipment: (no DME)  ADL Assistance: Independent  Homemaking Assistance: Independent  Homemaking Responsibilities: Yes  Ambulation Assistance: Independent  Transfer Assistance: Independent  Active : Yes  Occupation: Retired  Additional Comments: patient denies history of falls at home  Cognition   Cognition  Overall Cognitive Status: WNL    Objective          AROM RLE (degrees)  RLE AROM: WFL  AROM LLE (degrees)  LLE AROM : WFL  Strength RLE  Strength RLE: WFL  Strength LLE  Strength LLE: WFL        Bed mobility  Supine to Sit: Modified independent(head of bed elevated)  Sit to Supine: Modified independent(head of bed elevated)  Transfers  Sit to Stand: Independent(from EOB)  Stand to sit:  Independent(to EOB)  Ambulation  Ambulation?: Yes  Ambulation 1  Surface: level tile  Device: No Device  Assistance: Independent  Quality of Gait: steady gait with no loss of balance noted, iggy and mechanics WFL  Distance: 350', 350'  Stairs/Curb  Stairs?: Yes  Stairs  # Steps : 12  Rails: Right ascending  Device: No Device  Assistance: Modified independent   Comment: reciprocal pattern ascending and descending, steady gait on stairs      Balance  Posture: Good  Sitting - Static: Good  Sitting - Dynamic: Good  Standing - Static: Good  Standing - Dynamic: Good        Plan   Plan  Times per week: d/c from acute care PT  Safety Devices  Type of devices: Left in bed, Call light within reach, Bed alarm in place      OutComes Score                                                  AM-PAC Score  AM-PAC Inpatient Mobility Raw Score : 24 (07/02/20 1110)  AM-PAC Inpatient T-Scale Score : 61.14 (07/02/20 1110)  Mobility Inpatient CMS 0-100% Score: 0 (07/02/20 1110)  Mobility Inpatient CMS G-Code Modifier : CH (07/02/20 1110)          Goals  Short term goals  Time Frame for Short term goals: none; patient will be discharged from acute care PT  Patient Goals   Patient goals : to go home       Therapy Time   Individual Concurrent Group Co-treatment   Time In 0930         Time Out 0955         Minutes 25         Timed Code Treatment Minutes: 10 Minutes    Timed Code Treatment Minutes:  10 Minutes    Total Treatment Minutes:    10 minutes treatment + 15 minutes evaluation = 25 total treatment minutes    [place], [place] [address]80

## 2020-07-02 NOTE — CARE COORDINATION
CASE MANAGEMENT INITIAL ASSESSMENT      Reviewed chart and completed assessment via telephone with:  Explained Case Management role/services: To patient and daughter at bedside triggered by age and diagnosis    Primary contact information: Daughter Aletha Haley [phone]    Admit date/status: Inpatient 7/1/20  Diagnosis: TIA  Is this a Readmission?:  No    Insurance: [address] required for SNF - Y     3 night stay required - N    Living arrangements, Adls, care needs, prior to admission: from home still drives and IPTA with ADL's    Transportation: TBD    Durable Medical Equipment at home: Walker__Cane__RTS__ BSC__Shower Chair__  02__ HHN__ CPAP__  BiPap__  Hospital Bed__ W/C___ Other__________    Services in the home and/or outpatient, prior to admission: None    PT/OT recs: Home with assist PRN    Hospital Exemption Notification (HEN): n/a    Barriers to discharge: None identified at time of initial assessment    Plan/comments: To return home independently with family support and no need for services.      ECOC on chart for MD signature

## 2020-07-02 NOTE — PROGRESS NOTES
Shift assessment completed and charted. VSS, b/p elevated, no prns needed. Orthostatic completed and charted. A&OX4, independent in transfer. Q4 neuro and NIHSS scale completed, per pt skin crawling on LUE resolved. Bed locked and in lowest position. Call light within reach. Pt denies any other needs at this time. Will continue to monitor.

## 2020-07-02 NOTE — DISCHARGE SUMMARY
Hospital Medicine Discharge Summary    Patient ID: Yulia Swann      Patient's PCP: MELECIO Greenberg CNP    Admit Date: 7/1/2020     Discharge Date:   7/2/2020    Admitting Physician: Radha Corral MD     Discharge Physician: Linnie Boxer, APRN - CNP     Discharge Diagnoses: Active Hospital Problems    Diagnosis    Left sided numbness [R20.0]    Dyslipidemia [E78.5]    TIA involving right internal carotid artery [G45.1]    CAD in native artery [I25.10]    HTN (hypertension), benign [I10]    Mixed hyperlipidemia [E78.2]       The patient was seen and examined on day of discharge and this discharge summary is in conjunction with any daily progress note from day of discharge. Hospital Course:     80 y.o. male with history of hypertension, hyperlipidemia, CAD who presented to [facility] with acute dizziness with left sided numbness and tingling involving his left upper and left lower extremities which started about 7 AM this morning. Denied any slurred speech, dysphagia or focal weakness. Seen in the ER. BP was elevated. Code stroke activated and stroke team determined patient not a TIA candidate due to mild symptoms. CT head and CTA head and neck were unremarkable. Pt reported he took a full dose aspirin before coming to ER. Pt states he was having non exertional substernal chest pain with  intermittent numbness and tingling in his LLE and LUE which resolves spontaneously for the past several weeks. He had seen his cardiologist, Dr Perfecto Dixon and is scheduled for an outpatient stress test on Monday. Pt denied any chest pain or SOB today.      TIA/acute CVA: Presented with dizziness, left-sided numbness and tingling now resolved. Elevated BP likely contributing on initial presentation. CT head and CTA head and neck were unremarkable. Continue aspirin and statin. MRI brain negative. ECHO completed -  EF 55% with no RWMAs. Neurology consulted.   Recommends outpatient 30 day event monitor with Dr. Karma Polanco. He has an appointment for an outpatient stress test on Monday, 7/6. Hypertension:  poorly controlled on initial presentation which improved. Continue home meds. Hyperlipidemia: Continue statin.       CAD s/p PCI: pt reported chest pain at home but none in recent days. Already saw his cardiologist with plans for stress test on Monday. Initial trop neg and EKG non acute. Check serial trop. If trop untrending, will consult cardio. Continue aspirin, statin, held BB, ACEI in light of permissive HTN in CVA for now.      UTI: UA abnormal. Ucx reveals 50,000 Enterococcus faecalis. Called patient and changed abx from [facility] to [facility] - prescription sent to [facility].         Physical Exam Performed:     BP (!) 167/73   Pulse 56   Temp 98.5 °F (36.9 °C) (Oral)   Resp 16   Ht 5' 8\" (1.727 m)   Wt 168 lb 11.2 oz (76.5 kg)   SpO2 97%   BMI 25.65 kg/m²       General appearance:  No apparent distress, appears stated age and cooperative. HEENT:  Normal cephalic, atraumatic without obvious deformity. Pupils equal, round, and reactive to light. Extra ocular muscles intact. Conjunctivae/corneas clear. Neck: Supple, with full range of motion. No jugular venous distention. Trachea midline. Respiratory:  Normal respiratory effort. Clear to auscultation, bilaterally without Rales/Wheezes/Rhonchi. Cardiovascular:  Regular rate and rhythm with normal S1/S2 without murmurs, rubs or gallops. Abdomen: Soft, non-tender, non-distended with normal bowel sounds. Musculoskeletal:  No clubbing, cyanosis or edema bilaterally. Full range of motion without deformity. Skin: Skin color, texture, turgor normal.  No rashes or lesions. Neurologic:  Neurovascularly intact without any focal sensory/motor deficits.  Cranial nerves: II-XII intact, grossly non-focal.  Psychiatric:  Alert and oriented, thought content appropriate, normal insight  Capillary Refill: Brisk,< 3 seconds   Peripheral Pulses: +2 palpable, equal bilaterally       Labs: For convenience and continuity at follow-up the following most recent labs are provided:      CBC:    Lab Results   Component Value Date    WBC 7.0 07/02/2020    HGB 14.4 07/02/2020    HCT 42.8 07/02/2020     07/02/2020       Renal:    Lab Results   Component Value Date     07/02/2020    K 4.2 07/02/2020     07/02/2020    CO2 23 07/02/2020    BUN 13 07/02/2020    CREATININE 1.0 07/02/2020    CALCIUM 8.5 07/02/2020    PHOS 2.9 04/05/2013         Significant Diagnostic Studies    Radiology:   MRI brain without contrast   Final Result   Mild chronic small ischemic disease. No acute stroke, midline shift or mass   effect. XR CHEST PORTABLE   Final Result   No radiographic evidence of acute pulmonary disease. CTA Head W Con And CTA Neck W Con   Final Result   1. Unremarkable CTA of the head and neck. 2. 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 Direct visualization   could be performed for further evaluation. CT Head WO Contrast   Final Result   No hemorrhage or mass identified. Minimal periventricular small vessel ischemic change for age. Results discussed with SARABJIT Santos by Erin [facility]. Desirae Maloney MD at 11:50 am on   7/1/2020                Consults:     IP CONSULT TO STROKE TEAM  IP CONSULT TO HOSPITALIST  IP CONSULT TO NEUROLOGY    Disposition:  Home     Condition at Discharge: Stable    Discharge Instructions/Follow-up:  F/u with PCP in 1-2 weeks. F/u Monday for outpatient stress testing. Neurology also recommends 30 day event monitor from Dr. Genia Rasheed. Discussed with patient and daughter.     Code Status:  Full Code     Activity: activity as tolerated    Diet: regular diet      Discharge Medications:     Current Discharge Medication List           Details   cefUROXime (CEFTIN) 250 MG tablet Take 1 tablet by mouth 2 times daily for 7 days  Qty: 14 tablet, Refills: 0              Details   Multiple Vitamins-Minerals (MULTIVITAMIN PO) Take by mouth daily      Multiple Vitamins-Minerals (PRESERVISION AREDS PO) Take by mouth daily      Calcium Citrate-Vitamin D (CITRACAL + D PO) Take by mouth daily      carvedilol (COREG) 12.5 MG tablet TAKE ONE TABLET BY MOUTH TWICE A DAY WITH FOOD      amLODIPine (NORVASC) 5 MG tablet Take 1 tablet by mouth daily. Qty: 30 tablet, Refills: 0      aspirin 81 MG tablet Take 1 tablet by mouth daily. Qty: 30 tablet    Associated Diagnoses: Dysuria      lisinopril (PRINIVIL;ZESTRIL) 20 MG tablet Take 20 mg by mouth daily. atorvastatin (LIPITOR) 40 MG tablet Take 40 mg by mouth daily. tadalafil (CIALIS) 10 MG tablet Take 10 mg by mouth every 48 hours Indications: BPH For BPH             Time Spent on discharge is more than 30 minutes in the examination, evaluation, counseling and review of medications and discharge plan. Signed:    MELECIO Shah CNP   7/2/2020      Thank you MELECIO Gonsalez CNP for the opportunity to be involved in this patient's care. If you have any questions or concerns please feel free to contact me at [phone].

## 2020-07-02 NOTE — PROGRESS NOTES
Patient educated on follow up care and discharge instructions. Patient educated on new antibiotic. Denies further needs. Discharged in stable condition with his two daughters.

## 2020-07-02 NOTE — CONSULTS
Rocephin    Recommendation:  Echocardiogram  Fasting lipid  Continue aspirin. No need to change AP therapy  Statin  Continue blood pressure monitor and resume home blood pressure medications  Telemetry  DVT and GI prophylaxis  Hydration continue antibiotics  Blood sugar monitor  Neurochecks  Follow-up with his cardiologist on Monday for stress testing. Would recommend event monitor for 1 month to rule out paroxysmal A. fib with his cardiologist  Stroke prevention and risk of strokes after TIA was discussed today  Can be discharged when medically stable. Patient's instructions:  Secondary stroke prevention was discussed with the patient including: Blood pressure monitor and control, lifestyle adjustments and modification, use of AP therapy, Statin and possible side effect, dietary restriction, risk of recurrence and the importance of frequent walking and exercise. Thank you for referring such patient. If you have any questions regarding my consult note, please don't hesitate to call me. Carlos Peña MD  [phone]    This dictation was generated by voice recognition computer software.  Although all attempts are made to edit the dictation for accuracy, there may be errors in the  transcription that are not intended

## 2020-07-03 LAB
ORGANISM: ABNORMAL
URINE CULTURE, ROUTINE: ABNORMAL

## 2020-07-03 RX ORDER — NITROFURANTOIN 25; 75 MG/1; MG/1
100 CAPSULE ORAL 2 TIMES DAILY
Qty: 14 CAPSULE | Refills: 0 | Status: SHIPPED | OUTPATIENT
Start: 2020-07-03 | End: 2020-07-10

## 2020-07-06 ENCOUNTER — TELEPHONE (OUTPATIENT)
Dept: INTERNAL MEDICINE CLINIC | Age: 85
End: 2020-07-06

## 2020-07-07 NOTE — TELEPHONE ENCOUNTER
Leticia [facility] Transitions Initial Follow Up Call    Outreach made within 2 business days of discharge: No    Patient: Nagi Peña Patient : 1935   MRN: <[id number]>  Reason for Admission: There are no discharge diagnoses documented for the most recent discharge. Discharge Date: 20       Sp[facility] with: [facility]    Discharge department/facility: Cardiology he guess had a possible stroke    TCM Interactive Patient Contact:  Was patient able to fill all prescriptions: Yes  Was patient instructed to bring all medications to the follow-up visit: No:   Is patient taking all medications as directed in the discharge summary?  No  Does patient understand their discharge instructions: Yes  Does patient have questions or concerns that need addressed prior to 7-14 day follow up office visit: no    Scheduled appointment with PCP within 7-14 days    Follow Up  Future Appointments   Date Time Provider Nicole Burrell   2020  1:00 PM MELECIO Castillo CNP AND ESPERANZA Kennedy

## 2020-07-13 ENCOUNTER — VIRTUAL VISIT (OUTPATIENT)
Dept: INTERNAL MEDICINE CLINIC | Age: 85
End: 2020-07-13
Payer: MEDICARE

## 2020-07-13 PROCEDURE — 99495 TRANSJ CARE MGMT MOD F2F 14D: CPT | Performed by: NURSE PRACTITIONER

## 2020-07-13 PROCEDURE — 1111F DSCHRG MED/CURRENT MED MERGE: CPT | Performed by: NURSE PRACTITIONER

## 2020-07-13 ASSESSMENT — ENCOUNTER SYMPTOMS
COUGH: 0
VOMITING: 0
SHORTNESS OF BREATH: 0
CONSTIPATION: 0
NAUSEA: 0
SINUS PAIN: 0
SORE THROAT: 0
CHEST TIGHTNESS: 0
DIARRHEA: 0

## 2020-07-13 ASSESSMENT — PATIENT HEALTH QUESTIONNAIRE - PHQ9
SUM OF ALL RESPONSES TO PHQ QUESTIONS 1-9: 0
1. LITTLE INTEREST OR PLEASURE IN DOING THINGS: 0
SUM OF ALL RESPONSES TO PHQ9 QUESTIONS 1 & 2: 0
2. FEELING DOWN, DEPRESSED OR HOPELESS: 0
SUM OF ALL RESPONSES TO PHQ QUESTIONS 1-9: 0

## 2020-07-13 NOTE — PROGRESS NOTES
Post-Discharge Transitional Care Management Services or Hospital Follow Up    [facility]   YOB: 1935    Date of Office Visit:  7/13/2020  Date of Hospital Admission: 7/1/20  Date of Hospital Discharge: 7/2/20  Readmission Risk Score(high >=14%. Medium >=10%):No data recorded    Care management risk score Rising risk (score 2-5) and Complex Care (Scores >=6): 0     Non face to face  following discharge, date last encounter closed (first attempt may have been earlier): 7/7/2020  7:53 AM 7/7/2020  7:53 AM    Call initiated 2 business days of discharge: No     Patient Active Problem List   Diagnosis    RBBB    History of PTCA    CAD in native artery    Vertigo    HTN (hypertension), benign    Mixed hyperlipidemia    Back pain    Malignant hypertension    TIA involving right internal carotid artery    Left sided numbness    Dyslipidemia     Allergies   Allergen Reactions    Levaquin  [Levofloxacin In D5w]      Leg pain       Medications listed as ordered at the time of discharge from hospital   Ernestina Hwang   Home Medication Instructions KELLI:    Printed on:07/13/20 5214   Medication Information                      amLODIPine (NORVASC) 5 MG tablet  Take 1 tablet by mouth daily. aspirin 81 MG tablet  Take 1 tablet by mouth daily. atorvastatin (LIPITOR) 40 MG tablet  Take 40 mg by mouth daily. Calcium Citrate-Vitamin D (CITRACAL + D PO)  Take by mouth daily             carvedilol (COREG) 12.5 MG tablet  TAKE ONE TABLET BY MOUTH TWICE A DAY WITH FOOD             lisinopril (PRINIVIL;ZESTRIL) 20 MG tablet  Take 20 mg by mouth daily.              Multiple Vitamins-Minerals (MULTIVITAMIN PO)  Take by mouth daily             Multiple Vitamins-Minerals (PRESERVISION AREDS PO)  Take by mouth daily             tadalafil (CIALIS) 10 MG tablet  Take 10 mg by mouth every 48 hours Indications: BPH For BPH                   Medications marked \"taking\" at this time  Outpatient Medications Marked as Taking for the 7/13/20 encounter (Virtual Visit) with Wetzel Bumpers, APRN - CNP   Medication Sig Dispense Refill    Multiple Vitamins-Minerals (MULTIVITAMIN PO) Take by mouth daily      Multiple Vitamins-Minerals (PRESERVISION AREDS PO) Take by mouth daily      Calcium Citrate-Vitamin D (CITRACAL + D PO) Take by mouth daily      carvedilol (COREG) 12.5 MG tablet TAKE ONE TABLET BY MOUTH TWICE A DAY WITH FOOD      amLODIPine (NORVASC) 5 MG tablet Take 1 tablet by mouth daily. (Patient taking differently: Take 5 mg by mouth daily Takes at night) 30 tablet 0    tadalafil (CIALIS) 10 MG tablet Take 10 mg by mouth every 48 hours Indications: BPH For BPH      aspirin 81 MG tablet Take 1 tablet by mouth daily. 30 tablet     lisinopril (PRINIVIL;ZESTRIL) 20 MG tablet Take 20 mg by mouth daily.  atorvastatin (LIPITOR) 40 MG tablet Take 40 mg by mouth daily. Medications patient taking as of now reconciled against medications ordered at time of hospital discharge: Yes    Chief Complaint   Patient presents with    Follow-Up from Hospital     Mr. Beau Devlin presents after recent hospitalization for acute onset left sided weakness/confusion. He states he was having a normal day and then happened to notice acute dizziness with left sided numbness and tingling involving his left upper and left lower extremities. Denies slurred speech/dysarthria at the time. He states he underwent extensive workup there at the hospital however no abnormalities noted. There was, however, a UTI that was discovered. He has had OP follow up with cardiology (Dr. Perfecto Dixon) which showed normal stress test. NSR with RBBB on EKG. Denies further symptoms. States he feels as his normal. Denies further dysuria symptoms. Denies hematuria. Inpatient course: Discharge summary reviewed- see chart. Interval history/Current status: See above    Review of Systems   Constitutional: Negative for fever. HENT: Negative for sinus pain and sore throat. Respiratory: Negative for cough, chest tightness and shortness of breath. Cardiovascular: Negative for chest pain and palpitations. Gastrointestinal: Negative for constipation, diarrhea, nausea and vomiting. Genitourinary: Negative for dysuria and urgency. Skin: Negative for rash. Neurological: Negative for dizziness and weakness. There were no vitals filed for this visit. There is no height or weight on file to calculate BMI. Wt Readings from Last 3 Encounters:   07/01/20 168 lb 11.2 oz (76.5 kg)   11/22/19 162 lb (73.5 kg)   08/20/19 162 lb (73.5 kg)     BP Readings from Last 3 Encounters:   07/02/20 (!) 175/78   11/22/19 138/60   08/20/19 129/60     Physical Exam    Assessment/Plan:  1. Left-sided weakness    - NV DISCHARGE MEDS RECONCILED W/ CURRENT OUTPATIENT MED LIST  - Urinalysis with Reflex Culture; Future    Resolved. Continue to monitor. Encouraged regular physical activity. Continue with 30 day event monitor with Dr. Heather Soto. 2. Urinary tract infection without hematuria, site unspecified    - Urinalysis with Reflex Culture; Future      Will obtain updated urinalysis to ensure resolution of symptoms. Encouraged to increase hydration to flush the urinary tract. Encouraged proper genital hygiene to prevent further UTI.      Medical Decision Making: moderate complexity

## 2020-07-14 ENCOUNTER — HOSPITAL ENCOUNTER (OUTPATIENT)
Age: 85
Discharge: HOME | End: 2020-07-14
Payer: MEDICARE

## 2020-07-14 LAB
BILIRUBIN URINE: NEGATIVE
BLOOD, URINE: NEGATIVE
CLARITY: CLEAR
COLOR: YELLOW
EPITHELIAL CELLS, UA: ABNORMAL /HPF (ref 0–5)
GLUCOSE URINE: NEGATIVE MG/DL
KETONES, URINE: NEGATIVE MG/DL
LEUKOCYTE ESTERASE, URINE: ABNORMAL
MICROSCOPIC EXAMINATION: YES
NITRITE, URINE: NEGATIVE
PH UA: 6.5 (ref 5–8)
PROTEIN UA: NEGATIVE MG/DL
RBC UA: ABNORMAL /HPF (ref 0–4)
SPECIFIC GRAVITY UA: <=1.005 (ref 1–1.03)
URINE REFLEX TO CULTURE: ABNORMAL
URINE TYPE: ABNORMAL
UROBILINOGEN, URINE: 0.2 E.U./DL
WBC UA: ABNORMAL /HPF (ref 0–5)

## 2020-07-14 PROCEDURE — 81001 URINALYSIS AUTO W/SCOPE: CPT

## 2020-10-05 ENCOUNTER — NURSE ONLY (OUTPATIENT)
Dept: INTERNAL MEDICINE CLINIC | Age: 85
End: 2020-10-05
Payer: MEDICARE

## 2020-10-05 VITALS — TEMPERATURE: 98.1 F

## 2020-10-05 PROCEDURE — G0008 ADMIN INFLUENZA VIRUS VAC: HCPCS | Performed by: INTERNAL MEDICINE

## 2020-10-05 PROCEDURE — 90694 VACC AIIV4 NO PRSRV 0.5ML IM: CPT | Performed by: INTERNAL MEDICINE

## 2020-11-23 ASSESSMENT — LIFESTYLE VARIABLES
AUDIT TOTAL SCORE: INCOMPLETE
HOW OFTEN DO YOU HAVE A DRINK CONTAINING ALCOHOL: 0
AUDIT-C TOTAL SCORE: INCOMPLETE
HOW OFTEN DO YOU HAVE A DRINK CONTAINING ALCOHOL: NEVER

## 2020-11-23 ASSESSMENT — PATIENT HEALTH QUESTIONNAIRE - PHQ9
2. FEELING DOWN, DEPRESSED OR HOPELESS: 0
SUM OF ALL RESPONSES TO PHQ9 QUESTIONS 1 & 2: 0
SUM OF ALL RESPONSES TO PHQ QUESTIONS 1-9: 0
1. LITTLE INTEREST OR PLEASURE IN DOING THINGS: 0

## 2020-11-30 ENCOUNTER — OFFICE VISIT (OUTPATIENT)
Dept: INTERNAL MEDICINE CLINIC | Age: 85
End: 2020-11-30
Payer: MEDICARE

## 2020-11-30 VITALS
TEMPERATURE: 98.1 F | SYSTOLIC BLOOD PRESSURE: 144 MMHG | HEART RATE: 65 BPM | WEIGHT: 169 LBS | DIASTOLIC BLOOD PRESSURE: 68 MMHG | BODY MASS INDEX: 25.7 KG/M2 | OXYGEN SATURATION: 99 %

## 2020-11-30 PROCEDURE — G0439 PPPS, SUBSEQ VISIT: HCPCS | Performed by: NURSE PRACTITIONER

## 2020-11-30 RX ORDER — TADALAFIL 10 MG/1
5 TABLET ORAL DAILY
Qty: 30 TABLET | Refills: 5 | Status: SHIPPED
Start: 2020-11-30

## 2020-11-30 NOTE — PROGRESS NOTES
Medicare Annual Wellness Visit  Name: Rigoberto Fu Date: 12/3/2020   MRN: <[id number]> Sex: Male   Age: 80 y.o. Ethnicity: Non-/Non    : 1935 Race: Enedelia Kidd is here for Medicare AWV    Screenings for behavioral, psychosocial and functional/safety risks, and cognitive dysfunction are all negative except as indicated below. These results, as well as other patient data from the [address] form, are documented in Flowsheets linked to this Encounter. Pt lost his wife  in nursing home. Had not seen her since March until she was not doing well. Was able to be with his children during this time and this has been good. Looks at lots of pictures. He is managing COVID safely and making smart decisions. Colonoscopy  with Dr Maddie Rodriguez. Recommended colonoscopy again but pt does not think he needs colonoscopy at this time. Allergies   Allergen Reactions    Levaquin  [Levofloxacin In D5w]      Leg pain         Prior to Visit Medications    Medication Sig Taking? Authorizing Provider   tadalafil (CIALIS) 10 MG tablet Take 0.5 tablets by mouth daily Indications: BPH For BPH Yes Mara Gamble, APRN - CNP   Multiple Vitamins-Minerals (MULTIVITAMIN PO) Take by mouth daily Yes Historical Provider, MD   Multiple Vitamins-Minerals (PRESERVISION AREDS PO) Take by mouth daily Yes Historical Provider, MD   Calcium Citrate-Vitamin D (CITRACAL + D PO) Take by mouth daily Yes Historical Provider, MD   carvedilol (COREG) 12.5 MG tablet TAKE ONE TABLET BY MOUTH TWICE A DAY WITH FOOD Yes Historical Provider, MD   amLODIPine (NORVASC) 5 MG tablet Take 1 tablet by mouth daily. Patient taking differently: Take 5 mg by mouth daily Takes at night Yes Patt Dewey MD   aspirin 81 MG tablet Take 1 tablet by mouth daily. Yes Nikolay Officer, MD   lisinopril (PRINIVIL;ZESTRIL) 20 MG tablet Take 20 mg by mouth daily.  Yes Historical Provider, MD   atorvastatin (LIPITOR) 40 MG tablet Take 40 mg by mouth daily.  Yes Historical Provider, MD         Past Medical History:   Diagnosis Date    BPH (benign prostatic hyperplasia)     CAD (coronary artery disease)     Colon polyps 1990    hyperplastic    History of PTCA     Hyperlipidemia     Hypertension     Influenza A 01/09/2018    RBBB     Vertigo     resolved       Past Surgical History:   Procedure Laterality Date    COLONOSCOPY  02/27/90 05/20/05    COLONOSCOPY  7/28/15    polyp    CORONARY ANGIOPLASTY WITH STENT PLACEMENT  2010 2013 2010, 2013 x 2 stents    CYST REMOVAL  1956    back    FINGER SURGERY Right 1946    thumb for infection    HAND SURGERY Left 03/02/2017    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    Anorectal fistula         Family History   Problem Relation Age of Onset    Cancer Mother         unknown     Heart Disease Father     Heart Attack Father     Cancer Brother         Lung       CareTeam (Including outside providers/suppliers regularly involved in providing care):   Patient Care Team:  MELECIO Concepcion CNP as PCP - General (Family Nurse Practitioner)  MELECIO Concepcion CNP as PCP - [facility] Empaneled Provider  Yesica Arauz MD as Consulting Physician (Urology)  Isak Perdomo MD as Consulting Physician (Cardiology)  Alba Francois MD as Surgeon (Orthopedic Surgery)    Wt Readings from Last 3 Encounters:   11/30/20 169 lb (76.7 kg)   07/01/20 168 lb 11.2 oz (76.5 kg)   11/22/19 162 lb (73.5 kg)     Vitals:    11/30/20 1100   BP: (!) 144/68   Site: Left Upper Arm   Position: Sitting   Cuff Size: Large Adult   Pulse: 65   Temp: 98.1 °F (36.7 °C)   TempSrc: Temporal   SpO2: 99%   Weight: 169 lb (76.7 kg)     Body mass index is 25.7 kg/m². Based upon direct observation of the patient, evaluation of cognition reveals recent and remote memory intact. General Appearance: alert and oriented to person, place and time, well developed and well- nourished, in no acute distress  Skin: warm and dry, no rash or erythema  Head: normocephalic and atraumatic  Eyes: pupils equal, round, and reactive to light, extraocular eye movements intact, conjunctivae normal  ENT: tympanic membrane, external ear and ear canal normal bilaterally, nose without deformity, nasal mucosa and turbinates normal without polyps  Neck: supple and non-tender without mass, no thyromegaly or thyroid nodules, no cervical lymphadenopathy  Pulmonary/Chest: clear to auscultation bilaterally- no wheezes, rales or rhonchi, normal air movement, no respiratory distress  Cardiovascular: normal rate, regular rhythm, normal S1 and S2, no murmurs, rubs, clicks, or gallops, distal pulses intact, no carotid bruits  Abdomen: soft, non-tender, non-distended, normal bowel sounds, no masses or organomegaly  Extremities: no cyanosis, clubbing or edema  Musculoskeletal: normal range of motion, no joint swelling, deformity or tenderness  Neurologic: reflexes normal and symmetric, no cranial nerve deficit, gait, coordination and speech normal    Patient's complete Health Risk Assessment and screening values have been reviewed and are found in Flowsheets. The following problems were reviewed today and where indicated follow up appointments were made and/or referrals ordered. Positive Risk Factor Screenings with Interventions:       General Health and ACP:  General  In general, how would you say your health is?: Excellent  In the past 7 days, have you experienced any of the following?  New or Increased Pain, New or Increased Fatigue, Loneliness, Social Isolation, Stress or Anger?: None of These  Do you get the social and emotional support that you need?: Yes  Do you have a Living Will?: Yes  Advance Directives     Power of  Living Will ACP-Advance Directive ACP-Power of     Not on File Not on File [address] Interventions:  · N/A    Personalized Preventive Plan   Current Health Maintenance Status  Immunization History   Administered Date(s) Administered    Influenza 10/11/2013    Influenza Virus Vaccine 11/21/2008, 10/16/2009, 10/09/2010, 10/11/2011    Influenza, High Dose (Fluzone 65 yrs and older) 10/10/2014, 09/01/2015, 10/14/2016, 09/18/2017, 10/26/2018    Influenza, Quadv, adjuvanted, 65 yrs +, IM, PF (Fluad) 10/05/2020    Influenza, Triv, inactivated, subunit, adjuvanted, IM (Fluad 65 yrs and older) 10/18/2019    Pneumococcal Conjugate 13-valent ([id number]) 09/12/2016    Pneumococcal Polysaccharide ([id number]) 03/26/2001, 08/09/2011    Tdap (Boostrix, Adacel) 07/20/2006    Zoster Live (Zostavax) 01/01/2010    Zoster Recombinant (Shingrix) 07/18/2020, 11/08/2020        Health Maintenance   Topic Date Due    DTaP/Tdap/Td vaccine (2 - Td) 07/20/2016    Annual Wellness Visit (AWV)  05/29/2019    Lipid screen  07/02/2021    Potassium monitoring  07/02/2021    Creatinine monitoring  07/02/2021    Flu vaccine  Completed    Shingles Vaccine  Completed    Pneumococcal 65+ years Vaccine  Completed    Hepatitis A vaccine  Aged Out    Hepatitis B vaccine  Aged Out    Hib vaccine  Aged Out    Meningococcal (ACWY) vaccine  Aged Out     Recommendations for [employer] Due: see orders and patient instructions/AVS.  . Recommended screening schedule for the next 5-10 years is provided to the patient in written form: see Patient Instructions/AVS.    Judy Petersen was seen today for medicare awv.     Diagnoses and all orders for this visit:    Routine general medical examination at a [facility] facility  Update Td at pharmacy. Dyslipidemia  Monitor, maintain statin    Mixed hyperlipidemia  Monitor maintain statin    Malignant hypertension  Monitor closely. Check BP at home. Maintain medication    Other orders  -     tadalafil (CIALIS) 10 MG tablet;  Take 0.5 tablets by mouth daily Indications: BPH For BPH

## 2020-11-30 NOTE — PATIENT INSTRUCTIONS
Personalized Preventive Plan for Katerina Piedra - 11/30/2020  Medicare offers a range of preventive health benefits. Some of the tests and screenings are paid in full while other may be subject to a deductible, co-insurance, and/or copay. Some of these benefits include a comprehensive review of your medical history including lifestyle, illnesses that may run in your family, and various assessments and screenings as appropriate. After reviewing your medical record and screening and assessments performed today your provider may have ordered immunizations, labs, imaging, and/or referrals for you. A list of these orders (if applicable) as well as your Preventive Care list are included within your After Visit Summary for your review. Other Preventive Recommendations:    · A preventive eye exam performed by an eye specialist is recommended every 1-2 years to screen for glaucoma; cataracts, macular degeneration, and other eye disorders. · A preventive dental visit is recommended every 6 months. · Try to get at least 150 minutes of exercise per week or 10,000 steps per day on a pedometer . · Order or download the FREE \"Exercise & Physical Activity: Your Everyday Guide\" from [employer]. Call 0-952.237.9801 or search [employer] online. · You need 1501-2410 mg of calcium and 8189-4963 IU of vitamin D per day. It is possible to meet your calcium requirement with diet alone, but a vitamin D supplement is usually necessary to meet this goal.  · When exposed to the sun, use a sunscreen that protects against both UVA and UVB radiation with an SPF of 30 or greater. Reapply every 2 to 3 hours or after sweating, drying off with a towel, or swimming. · Always wear a seat belt when traveling in a car. Always wear a helmet when riding a bicycle or motorcycle. Personalized Preventive Plan for Katerina Piedra - 11/30/2020  Medicare offers a range of preventive health benefits.  Some of the tests and screenings are paid in full while other may be subject to a deductible, co-insurance, and/or copay. Some of these benefits include a comprehensive review of your medical history including lifestyle, illnesses that may run in your family, and various assessments and screenings as appropriate. After reviewing your medical record and screening and assessments performed today your provider may have ordered immunizations, labs, imaging, and/or referrals for you. A list of these orders (if applicable) as well as your Preventive Care list are included within your After Visit Summary for your review. Other Preventive Recommendations:    A preventive eye exam performed by an eye specialist is recommended every 1-2 years to screen for glaucoma; cataracts, macular degeneration, and other eye disorders. A preventive dental visit is recommended every 6 months. Try to get at least 150 minutes of exercise per week or 10,000 steps per day on a pedometer . Order or download the FREE \"Exercise & Physical Activity: Your Everyday Guide\" from [employer]. Call 9-897.151.3140 or search [employer] online. You need 8466-7057 mg of calcium and 4950-8964 IU of vitamin D per day. It is possible to meet your calcium requirement with diet alone, but a vitamin D supplement is usually necessary to meet this goal.  When exposed to the sun, use a sunscreen that protects against both UVA and UVB radiation with an SPF of 30 or greater. Reapply every 2 to 3 hours or after sweating, drying off with a towel, or swimming. Always wear a seat belt when traveling in a car. Always wear a helmet when riding a bicycle or motorcycle.

## 2020-12-11 ENCOUNTER — TELEPHONE (OUTPATIENT)
Dept: INTERNAL MEDICINE CLINIC | Age: 85
End: 2020-12-11

## 2020-12-11 NOTE — TELEPHONE ENCOUNTER
Dr. Nazia Chacon / 2015 / [facility] / [phone]    Had to send fax request. Raisa Harvey reports. Pt informed I have requested. Pt received letter in June about Dr. Nazia Chacon alf. Will await fax from .

## 2020-12-11 NOTE — TELEPHONE ENCOUNTER
Patient calling. States we were going to check and see if he needed to do anything else about a colonoscopy. We were going to see when he was due.

## 2021-10-22 ENCOUNTER — NURSE ONLY (OUTPATIENT)
Dept: INTERNAL MEDICINE CLINIC | Age: 86
End: 2021-10-22
Payer: MEDICARE

## 2021-10-22 DIAGNOSIS — Z23 FLU VACCINE NEED: Primary | ICD-10-CM

## 2021-10-22 PROCEDURE — G0008 ADMIN INFLUENZA VIRUS VAC: HCPCS | Performed by: NURSE PRACTITIONER

## 2021-10-22 PROCEDURE — 90694 VACC AIIV4 NO PRSRV 0.5ML IM: CPT | Performed by: NURSE PRACTITIONER

## 2021-10-22 PROCEDURE — 99999 PR OFFICE/OUTPT VISIT,PROCEDURE ONLY: CPT | Performed by: NURSE PRACTITIONER

## 2021-11-30 NOTE — PATIENT INSTRUCTIONS
Please bring all medications in original bottles to next appointment. Personalized Preventive Plan for Cas Dickens - 12/1/2021  Medicare offers a range of preventive health benefits. Some of the tests and screenings are paid in full while other may be subject to a deductible, co-insurance, and/or copay. Some of these benefits include a comprehensive review of your medical history including lifestyle, illnesses that may run in your family, and various assessments and screenings as appropriate. After reviewing your medical record and screening and assessments performed today your provider may have ordered immunizations, labs, imaging, and/or referrals for you. A list of these orders (if applicable) as well as your Preventive Care list are included within your After Visit Summary for your review. Other Preventive Recommendations:    · A preventive eye exam performed by an eye specialist is recommended every 1-2 years to screen for glaucoma; cataracts, macular degeneration, and other eye disorders. · A preventive dental visit is recommended every 6 months. · Try to get at least 150 minutes of exercise per week or 10,000 steps per day on a pedometer . · Order or download the FREE \"Exercise & Physical Activity: Your Everyday Guide\" from The [employer]. Call 0-799.126.7714 or search The [employer] online. · You need 7589-8302 mg of calcium and 3068-5627 IU of vitamin D per day. It is possible to meet your calcium requirement with diet alone, but a vitamin D supplement is usually necessary to meet this goal.  · When exposed to the sun, use a sunscreen that protects against both UVA and UVB radiation with an SPF of 30 or greater. Reapply every 2 to 3 hours or after sweating, drying off with a towel, or swimming. · Always wear a seat belt when traveling in a car. Always wear a helmet when riding a bicycle or motorcycle.

## 2021-12-01 ENCOUNTER — TELEPHONE (OUTPATIENT)
Dept: INTERNAL MEDICINE CLINIC | Age: 86
End: 2021-12-01

## 2021-12-01 ENCOUNTER — OFFICE VISIT (OUTPATIENT)
Dept: INTERNAL MEDICINE CLINIC | Age: 86
End: 2021-12-01
Payer: MEDICARE

## 2021-12-01 VITALS
SYSTOLIC BLOOD PRESSURE: 132 MMHG | HEIGHT: 69 IN | HEART RATE: 56 BPM | WEIGHT: 167 LBS | TEMPERATURE: 97.6 F | OXYGEN SATURATION: 98 % | BODY MASS INDEX: 24.73 KG/M2 | DIASTOLIC BLOOD PRESSURE: 66 MMHG

## 2021-12-01 DIAGNOSIS — E78.5 DYSLIPIDEMIA: ICD-10-CM

## 2021-12-01 DIAGNOSIS — Z00.00 ROUTINE GENERAL MEDICAL EXAMINATION AT A HEALTH CARE FACILITY: Primary | ICD-10-CM

## 2021-12-01 DIAGNOSIS — I25.10 CAD IN NATIVE ARTERY: ICD-10-CM

## 2021-12-01 DIAGNOSIS — Z98.61 HISTORY OF PTCA: ICD-10-CM

## 2021-12-01 DIAGNOSIS — I10 MALIGNANT HYPERTENSION: ICD-10-CM

## 2021-12-01 PROBLEM — Z23 FLU VACCINE NEED: Status: ACTIVE | Noted: 2021-12-01

## 2021-12-01 PROCEDURE — G0439 PPPS, SUBSEQ VISIT: HCPCS | Performed by: NURSE PRACTITIONER

## 2021-12-01 ASSESSMENT — PATIENT HEALTH QUESTIONNAIRE - PHQ9
2. FEELING DOWN, DEPRESSED OR HOPELESS: 0
SUM OF ALL RESPONSES TO PHQ QUESTIONS 1-9: 0
SUM OF ALL RESPONSES TO PHQ9 QUESTIONS 1 & 2: 0
1. LITTLE INTEREST OR PLEASURE IN DOING THINGS: 0
SUM OF ALL RESPONSES TO PHQ QUESTIONS 1-9: 0
SUM OF ALL RESPONSES TO PHQ QUESTIONS 1-9: 0

## 2021-12-01 ASSESSMENT — LIFESTYLE VARIABLES: HOW OFTEN DO YOU HAVE A DRINK CONTAINING ALCOHOL: 0

## 2021-12-01 NOTE — PROGRESS NOTES
Medicare Annual Wellness Visit  Name: Sosa Kline Date: 2021   MRN: <[id number]> Sex: Male   Age: 80 y.o. Ethnicity: Non- / Non    : 1935 Race: White (non-)      Isabella Rodríguez is here for Medicare AWV    Screenings for behavioral, psychosocial and functional/safety risks, and cognitive dysfunction are all negative except as indicated below. These results, as well as other patient data from the [address] form, are documented in Flowsheets linked to this Encounter. He is out eating lunch most days at [employer]. Reads often and most days. He sees family often. Incontinence. Dr Valentin Heath given Mybetriq, self d/c and doing well. Allergies   Allergen Reactions    Levaquin  [Levofloxacin In D5w]      Leg pain         Prior to Visit Medications    Medication Sig Taking? Authorizing Provider   tadalafil (CIALIS) 10 MG tablet Take 0.5 tablets by mouth daily Indications: BPH For BPH Yes MELECIO Phillip - CNP   Multiple Vitamins-Minerals (MULTIVITAMIN PO) Take by mouth daily Yes Historical Provider, MD   Multiple Vitamins-Minerals (PRESERVISION AREDS PO) Take by mouth daily Yes Historical Provider, MD   Calcium Citrate-Vitamin D (CITRACAL + D PO) Take by mouth daily Yes Historical Provider, MD   carvedilol (COREG) 12.5 MG tablet TAKE ONE TABLET BY MOUTH TWICE A DAY WITH FOOD Yes Historical Provider, MD   amLODIPine (NORVASC) 5 MG tablet Take 1 tablet by mouth daily. Patient taking differently: Take 5 mg by mouth daily Takes at night Yes Maddison Israel MD   aspirin 81 MG tablet Take 1 tablet by mouth daily. Yes Teresa Danielson MD   lisinopril (PRINIVIL;ZESTRIL) 20 MG tablet Take 20 mg by mouth daily. Yes Historical Provider, MD   atorvastatin (LIPITOR) 40 MG tablet Take 40 mg by mouth daily.  Yes Historical Provider, MD         Past Medical History:   Diagnosis Date    BPH (benign prostatic hyperplasia)     CAD (coronary artery disease)     Colon polyps  hyperplastic    History of PTCA     Hyperlipidemia     Hypertension     Influenza A 01/09/2018    RBBB     Vertigo     resolved       Past Surgical History:   Procedure Laterality Date    COLONOSCOPY  02/27/90 05/20/05    COLONOSCOPY  7/28/15    polyp    CORONARY ANGIOPLASTY WITH STENT PLACEMENT  2010 2013 2010, 2013 x 2 stents    CYST REMOVAL  1956    back    FINGER SURGERY Right 1946    thumb for infection    HAND SURGERY Left 03/02/2017    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    Anorectal fistula         Family History   Problem Relation Age of Onset    Cancer Mother         unknown     Heart Disease Father     Heart Attack Father     Cancer Brother         Lung       CareTeam (Including outside providers/suppliers regularly involved in providing care):   Patient Care Team:  MELECIO Taylor CNP as PCP - General (Family Nurse Practitioner)  MELECIO Taylor CNP as PCP - [address] [place] [address] Provider  Darylene Sark, MD as Consulting Physician (Urology)  Meng Ruiz MD as Consulting Physician (Cardiology)  Loraine Watt MD as Surgeon (Orthopedic Surgery)    Wt Readings from Last 3 Encounters:   12/01/21 167 lb (75.8 kg)   11/30/20 169 lb (76.7 kg)   07/01/20 168 lb 11.2 oz (76.5 kg)     Vitals:    12/01/21 1038   BP: 132/66   Site: Left Upper Arm   Position: Sitting   Cuff Size: Large Adult   Pulse: 56   Temp: 97.6 °F (36.4 °C)   TempSrc: Temporal   SpO2: 98%   Weight: 167 lb (75.8 kg)   Height: 5' 8.5\" (1.74 m)     Body mass index is 25.02 kg/m².     Based upon direct observation of the patient, evaluation of cognition reveals recent and remote memory intact. General Appearance: alert and oriented to person, place and time, well developed and well- nourished, in no acute distress  Skin: warm and dry, no rash or erythema  Head: normocephalic and atraumatic  Eyes: pupils equal, round, and reactive to light, extraocular eye movements intact, conjunctivae normal  ENT: tympanic membrane, external ear and ear canal normal bilaterally, nose without deformity, nasal mucosa and turbinates normal without polyps  Neck: supple and non-tender without mass, no thyromegaly or thyroid nodules, no cervical lymphadenopathy  Pulmonary/Chest: clear to auscultation bilaterally- no wheezes, rales or rhonchi, normal air movement, no respiratory distress  Cardiovascular: normal rate, regular rhythm, normal S1 and S2, no murmurs, rubs, clicks, or gallops, distal pulses intact, no carotid bruits  Abdomen: soft, non-tender, non-distended, normal bowel sounds, no masses or organomegaly  Extremities: no cyanosis, clubbing or edema  Musculoskeletal: normal range of motion, no joint swelling, deformity or tenderness  Neurologic: reflexes normal and symmetric, no cranial nerve deficit, gait, coordination and speech normal    Patient's complete Health Risk Assessment and screening values have been reviewed and are found in Flowsheets. The following problems were reviewed today and where indicated follow up appointments were made and/or referrals ordered. Positive Risk Factor Screenings with Interventions:            General Health and ACP:  General  In general, how would you say your health is?: Excellent  In the past 7 days, have you experienced any of the following?  New or Increased Pain, New or Increased Fatigue, Loneliness, Social Isolation, Stress or Anger?: None of These  Do you get the social and emotional support that you need?: Yes  Do you have a Living Will?: Yes  Advance Directives     Power of  Living Will ACP-Advance Directive ACP-Power of     Not on File Not on File Not on File Not on File      General Health Risk Interventions:  · No Living Will: ACP documents already completed- patient asked to provide copy to the office        Personalized Preventive Plan   Current Health Maintenance Status  Immunization History   Administered Date(s) Administered    COVID-19, [employer], PF, 30mcg/0.3mL 01/16/2021, 02/06/2021    Influenza 10/11/2013    Influenza Virus Vaccine 11/21/2008, 10/16/2009, 10/09/2010, 10/11/2011    Influenza, High Dose (Fluzone 65 yrs and older) 10/10/2014, 09/01/2015, 10/14/2016, 09/18/2017, 10/26/2018    Influenza, Quadv, adjuvanted, 65 yrs +, IM, PF (Fluad) 10/05/2020, 10/22/2021    Influenza, Triv, inactivated, subunit, adjuvanted, IM (Fluad 65 yrs and older) 10/18/2019    Pneumococcal Conjugate 13-valent ([id number]) 09/12/2016    Pneumococcal Polysaccharide ([id number]) 03/26/2001, 08/09/2011    Tdap (Boostrix, Adacel) 07/20/2006    Zoster Live (Zostavax) 01/01/2010    Zoster Recombinant (Shingrix) 07/18/2020, 11/08/2020        Health Maintenance   Topic Date Due    DTaP/Tdap/Td vaccine (2 - Td or Tdap) 07/20/2016    Lipid screen  07/02/2021    Potassium monitoring  07/02/2021    Creatinine monitoring  07/02/2021    COVID-19 Vaccine (3 - Booster for [employer] series) 08/06/2021    Annual Wellness Visit (AWV)  12/01/2021    Flu vaccine  Completed    Shingles Vaccine  Completed    Pneumococcal 65+ years Vaccine  Completed    Hepatitis A vaccine  Aged Out    Hepatitis B vaccine  Aged Out    Hib vaccine  Aged Out    Meningococcal (ACWY) vaccine  Aged Out     Recommendations for [employer] Due: see orders and patient instructions/AVS.  . Recommended screening schedule for the next 5-10 years is provided to the patient in written form: see Patient Instructions/AVS.    Jacklynn Hashimoto was seen today for medicare awv.     Diagnoses and all orders for this visit:    Routine general medical examination at a health care facility    CAD in native artery  Maintain f/u [address].   Stable. Dyslipidemia  Maintain medication, stable.  Reviewed labs 11/2021    History of PTCA    Mixed hyperlipidemia    Malignant hypertension

## 2021-12-02 NOTE — TELEPHONE ENCOUNTER
Please make sure we get colonoscopy from 2015 [facility])? I'd like to review when it returns.

## 2021-12-03 NOTE — TELEPHONE ENCOUNTER
Awesome, I was able to read - I do not see, unfortunately, when they want him to f/u Colonoscopy if at all. Can you call Dr Margarita Briseno and see if they have when they recommend f/u colonoscopy if at all ?

## 2021-12-20 ENCOUNTER — HOSPITAL ENCOUNTER (EMERGENCY)
Age: 86
Discharge: HOME | End: 2021-12-20
Payer: MEDICARE

## 2021-12-20 VITALS
RESPIRATION RATE: 16 BRPM | HEART RATE: 81 BPM | WEIGHT: 164 LBS | TEMPERATURE: 99.8 F | DIASTOLIC BLOOD PRESSURE: 81 MMHG | BODY MASS INDEX: 24.86 KG/M2 | SYSTOLIC BLOOD PRESSURE: 145 MMHG | OXYGEN SATURATION: 97 % | HEIGHT: 68 IN

## 2021-12-20 DIAGNOSIS — U07.1 COVID: Primary | ICD-10-CM

## 2021-12-20 LAB — SARS-COV-2, NAAT: DETECTED

## 2021-12-20 PROCEDURE — 99282 EMERGENCY DEPT VISIT SF MDM: CPT

## 2021-12-20 PROCEDURE — 87635 SARS-COV-2 COVID-19 AMP PRB: CPT

## 2021-12-20 NOTE — ED PROVIDER NOTES
[address] ED  EMERGENCY DEPARTMENT ENCOUNTER        Pt Name: Vick Greenberg  MRN: [id number]  [place] 1935  Date of evaluation: 12/20/2021  Provider: ARLENE Greene  PCP: MELECIO Nolan - DARRELL    This patient was not seen and evaluated by the attending physician No att. providers found. I have evaluated this patient. My supervising physician was available for consultation. CHIEF COMPLAINT       Chief Complaint   Patient presents with    Positive For Covid-19     pt states he had COVID booster 1 week ago, started having a fever a few days later thought it was from the booster. Pt states positive COVID test today. pt wants infusion       HISTORY OF PRESENT ILLNESS   (Location/Symptom, Timing/Onset, Context/Setting, Quality, Duration, Modifying Factors, Severity)  Note limiting factors. Vick Greenberg is a 80 y.o. male who presents via private vehicle from his home for evaluation of Covid. ED Course as of 12/28/21 1149   Mon Dec 20, 2021   1752 He is here requesting an infusion for monoclonal antibodies  He had a booster shot last Monday. He started to have fever on Friday. He notes he has been taking tylenol. He has also had chills since Friday. No loss of smell or taste and no loss of appetite. He called his pcp and was told to come to the ER for regen cov infusion. He has had mild cough that started Saturday. He denies SOB. He has still been exercising. No chest pain. No syncope or lightheadedness. He has mild headache. He denies diarrhea nausea or emesis. [CS]      ED Course User Index  [CS] Karan Greenema     Nursing Notes were all reviewed and agreed with or any disagreements were addressed  in the HPI. Pt was seen during the [place] 19 pandemic. Appropriate PPE worn by ME during patient encounters. Pt seen during a time with constrained hospital bed capacity and other potential inpatient and outpatient resources were constrained due to the viral pandemic. REVIEW OF SYSTEMS    (2-9 systems for level 4, 10 or more for level 5)     Review of Systems    Positives and Pertinent negatives as per HPI. Except as noted abovein the ROS, all other systems were reviewed and negative. PAST MEDICAL HISTORY     Past Medical History:   Diagnosis Date    BPH (benign prostatic hyperplasia)     CAD (coronary artery disease)     Colon polyps 1990    hyperplastic    History of PTCA     Hyperlipidemia     Hypertension     Influenza A 01/09/2018    RBBB     Vertigo     resolved         SURGICAL HISTORY     Past Surgical History:   Procedure Laterality Date    COLONOSCOPY  02/27/90 05/20/05    COLONOSCOPY  7/28/15    polyp    CORONARY ANGIOPLASTY WITH STENT PLACEMENT  2010 2013 2010, 2013 x 2 stents    CYST REMOVAL  1956    back    FINGER SURGERY Right 1946    thumb for infection    HAND SURGERY Left 03/02/2017    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    Anorectal fistula         CURRENTMEDICATIONS       Previous Medications    AMLODIPINE (NORVASC) 5 MG TABLET    Take 1 tablet by mouth daily. ASPIRIN 81 MG TABLET    Take 1 tablet by mouth daily. ATORVASTATIN (LIPITOR) 40 MG TABLET    Take 40 mg by mouth daily. CALCIUM CITRATE-VITAMIN D (CITRACAL + D PO)    Take by mouth daily    CARVEDILOL (COREG) 12.5 MG TABLET    TAKE ONE TABLET BY MOUTH TWICE A DAY WITH FOOD    LISINOPRIL (PRINIVIL;ZESTRIL) 20 MG TABLET    Take 20 mg by mouth daily.     MULTIPLE VITAMINS-MINERALS (MULTIVITAMIN PO)    Take by mouth daily    MULTIPLE VITAMINS-MINERALS (PRESERVISION AREDS PO) Take by mouth daily    TADALAFIL (CIALIS) 10 MG TABLET    Take 0.5 tablets by mouth daily Indications: BPH For BPH         ALLERGIES     Levaquin  [levofloxacin in d5w]    FAMILYHISTORY       Family History   Problem Relation Age of Onset    Cancer Mother         unknown     Heart Disease Father     Heart Attack Father     Cancer Brother         Lung          SOCIAL HISTORY       Social History     Socioeconomic History    Marital status:      Spouse name: None    Number of children: None    Years of education: None    Highest education level: None   Occupational History    None   Tobacco Use    Smoking status: Never Smoker    Smokeless tobacco: Never Used   Substance and Sexual Activity    Alcohol use: Not Currently     Comment: rare glass of wine    Drug use: No    Sexual activity: None   Other Topics Concern    None   Social History Narrative    None     Social Determinants of Health     Financial Resource Strain:     Difficulty of Paying Living Expenses: Not on file   Food Insecurity:     Worried About Running Out of Food in the Last Year: Not on file    Damaris of Food in the Last Year: Not on file   Transportation Needs:     Lack of Transportation (Medical): Not on file    Lack of Transportation (Non-Medical):  Not on file   Physical Activity:     Days of Exercise per Week: Not on file    Minutes of Exercise per Session: Not on file   Stress:     Feeling of Stress : Not on file   Social Connections:     Frequency of Communication with Friends and Family: Not on file    Frequency of Social Gatherings with Friends and Family: Not on file    Attends Latter-day Services: Not on file    Active Member of Clubs or Organizations: Not on file    Attends Club or Organization Meetings: Not on file    Marital Status: Not on file   Intimate Partner Violence:     Fear of Current or Ex-Partner: Not on file    Emotionally Abused: Not on file    Physically Abused: Not on file   [facility] Sexually Abused: Not on file   Housing Stability:     Unable to Pay for Housing in the Last Year: Not on file    Number of Anita in the Last Year: Not on file    Unstable Housing in the Last Year: Not on file       SCREENINGS           Marisa HOSKINSID-19  High risk is defined as patient to meet at least one of the following criteria:      BMI greater than or equal to 35 No  Chronic kidney disease No  DiabetesNo  Immunosuppressive disease No  Currently receiving immunosuppressive treatment No  Greater than 65 Yes  Greater than or equal to 54 and have:   Cardiovascular disease OR Yes  Hypertension ORYes  chronic obstructive pulmonary disease/other chronic respiratory diseaseNo  Are 15to 16years of age and have  BMI greater than or equal to 85th percentile for their age and gender based on CDC growth charts ORNo  Sickle cell disease ORNo  congenital ORNo  acquired heart disease ORNo  Neurodevelopmental disorders such as CP ORNo  Medical related technological dependence such as tracheostomy gastrostomy positive pressure ventilation OR  Asthma, reactive airway disease or other chronic respiratory disease that requires daily medication for controlNo      PHYSICAL EXAM    (up to 7 for level 4, 8 or more for level 5)     ED Triage Vitals [12/20/21 1614]   BP Temp Temp Source Pulse Resp SpO2 Height Weight   (!) 188/68 99.8 °F (37.7 °C) Tympanic 63 18 96 % 5' 8\" (1.727 m) 164 lb (74.4 kg)       Physical Exam  PHYSICAL EXAM  BP (!) 188/68   Pulse 63   Temp 99.8 °F (37.7 °C) (Tympanic)   Resp 18   Ht 5' 8\" (1.727 m)   Wt 164 lb (74.4 kg)   SpO2 96%   BMI 24.94 kg/m²   GENERAL APPEARANCE: Awake and alert. Cooperative. Well-appearing adult male sitting upright in exam chair, nondiaphoretic breathing comfortably on room air showing no sign of acute respiratory distress. HEAD: Normocephalic. Atraumatic. EYES: PERRL. EOM's grossly intact.    ENT: Mucous membranes are moist oropharynx nonerythematous nonedematous uvula midline. Preston Jhon NECK: Supple. No anterior cervical lymphadenopathy. No JVD. HEART: RRR. No murmurs. Radial pulses 2+ symmetric. PT pulses 2+, symmetric. LUNGS: Respirations unlabored. CTAB. Good air exchange. Speaking comfortably in full sentences. ABDOMEN: Soft. Non-distended. Non-tender. No guarding or rebound. EXTREMITIES: No peripheral edema. Moves all extremities equally. All extremities neurovascularly intact. SKIN: Warm and dry. No acute rashes. NEUROLOGICAL: Alert and oriented. CN grossly intact. No gross facial drooping. Power intact to UE and LE, sensation intact x 4. No tremors or ataxia. Gait intact. PSYCHIATRIC: Normal mood and affect. DIAGNOSTIC RESULTS   LABS:    Labs Reviewed - No data to display    All other labs were within normal range or not returned as of this dictation. EKG: All EKG's are interpreted by the Emergency Department Physician who either signs orCo-signs this chart in the absence of a cardiologist.  Please see their note for interpretation of EKG. RADIOLOGY:   Non-plain film images such as CT, Ultrasound and MRI are read by the radiologist. Plain radiographic images are visualized andpreliminarily interpreted by the  ED Provider with the below findings:        Interpretation perthe Radiologist below, if available at the time of this note:    No orders to display     No results found.        PROCEDURES   Unless otherwise noted below, none     Procedures    CRITICAL CARE TIME   N/A    CONSULTS:  None      EMERGENCY DEPARTMENT COURSE and DIFFERENTIALDIAGNOSIS/MDM:   Vitals:    Vitals:    12/20/21 1614   BP: (!) 188/68   Pulse: 63   Resp: 18   Temp: 99.8 °F (37.7 °C)   TempSrc: Tympanic   SpO2: 96%   Weight: 164 lb (74.4 kg)   Height: 5' 8\" (1.727 m)       Patient was given thefollowing medications:  Medications - No data to display    PDMP Monitoring:    Last PDMP [facility] SYSTEM as Reviewed [facility]):  Review User Review Instant Review Result            Urine questions were addressed. Discharge Time out:  CC Reviewed Yes   Test Results Yes     Vitals:    12/20/21 1614   BP: (!) 188/68   Pulse: 63   Resp: 18   Temp: 99.8 °F (37.7 °C)   SpO2: 96%              FINAL IMPRESSION      1. COVID          DISPOSITION/PLAN   DISPOSITION        PATIENT REFERREDTO:  No follow-up provider specified.     DISCHARGE MEDICATIONS:  New Prescriptions    No medications on file       DISCONTINUED MEDICATIONS:  Discontinued Medications    No medications on file              (Please note that portions ofthis note were completed with a voice recognition program.  Efforts were made to edit the dictations but occasionally words are mis-transcribed.)    Andreina Chavez, [id number] (electronically signed)        Andreina Chavez, [address]  12/28/21 1152

## 2021-12-21 ENCOUNTER — CARE COORDINATION (OUTPATIENT)
Dept: CARE COORDINATION | Age: 86
End: 2021-12-21

## 2021-12-21 NOTE — CARE COORDINATION
Patient contacted regarding COVID-19 diagnosis. Discussed COVID-19 related testing which was available at this time. Test results were positive. Patient informed of results, if available? Yes. Ambulatory Care Manager contacted the patient by telephone to perform post discharge assessment. Call within 2 business days of discharge: Yes. Verified name and  with patient as identifiers. Provided introduction to self, and explanation of the CTN/ACM role, and reason for call due to risk factors for infection and/or exposure to COVID-19. Symptoms reviewed with patient who verbalized the following symptoms: loss of taste or smell, no new symptoms, no worsening symptoms and Loss of smell and congestion. Due to no new or worsening symptoms encounter was not routed to provider for escalation. Discussed follow-up appointments. If no appointment was previously scheduled, appointment scheduling offered: No.  [facility] follow up appointment(s):   Future Appointments   Date Time Provider Nicole Burrell   2021 10:30 AM [facility] CHAIR 2-A [facility] Romeo Officer   2022 10:30 AM MELECIO Bearden - CNP [facility] AND ESPERANZA PALOMO     Non-[facility] follow up appointment(s): N/A    Non-face-to-face services provided:  Obtained and reviewed discharge summary and/or continuity of care documents     Advance Care Planning:   Does patient have an Advance Directive:  decision maker updated. Educated patient about risk for severe COVID-19 due to risk factors according to CDC guidelines. ACM reviewed discharge instructions, medical action plan and red flag symptoms with the patient who verbalized understanding. Discussed COVID vaccination status: Yes. Education provided on COVID-19 vaccination as appropriate. Discussed exposure protocols and quarantine with CDC Guidelines.  Patient was given an opportunity to verbalize any questions and concerns and agrees to contact ACM or health care provider for questions related to their healthcare. Reviewed and educated patient on any new and changed medications related to discharge diagnosis     Was patient discharged with a pulse oximeter? No Discussed and confirmed pulse oximeter discharge instructions and when to notify provider or seek emergency care. [facility] provided contact information. Plan for follow-up call in 5-7 days based on severity of symptoms and risk factors. Summary   Outbound call made to patient d/t recent ED visit and being COVID positive. Reviewed discharge instructions regarding medication, follow up PCP, and S/S of when to return to the ED. Patient said he will be getting the Regen-COV infusion tomorrow. Patient was not discharged with any medication. Patient said he has lost hos smell and has congestion. Patient took his temperature today and it was 97.7 and he has not taken any Tylenol today. Patient is aware that he can reach out to his PCP with any questions or concerns. ACM educated patient on s/s to return to ED for ex: SOB that is persistent or gets worse, constant chest pain or pressure, leg or calf pain, swelling in legs, fever that does not go down with medications, new or worsening of symptoms, patient voiced understanding. Patient has been vaccinated times 3. ACM encouraged patient to drink plenty of fluid and rest. Patient denies having any other questions are concerns currently. Patient given [facility]'s contact information and is encouraged to call with any questions or concerns.         Plan  F/U on s/s with patient   Any needs or concerns    [facility]  [phone]  [address]  [address] [place] [address] Primary Care

## 2021-12-22 ENCOUNTER — HOSPITAL ENCOUNTER (OUTPATIENT)
Dept: NURSING | Age: 86
Setting detail: INFUSION SERIES
Discharge: HOME | End: 2021-12-22
Payer: MEDICARE

## 2021-12-22 VITALS
OXYGEN SATURATION: 100 % | TEMPERATURE: 97.2 F | HEIGHT: 68 IN | HEART RATE: 70 BPM | BODY MASS INDEX: 24.86 KG/M2 | WEIGHT: 164 LBS | DIASTOLIC BLOOD PRESSURE: 42 MMHG | RESPIRATION RATE: 18 BRPM | SYSTOLIC BLOOD PRESSURE: 154 MMHG

## 2021-12-22 PROCEDURE — 6360000002 HC RX W HCPCS: Performed by: INTERNAL MEDICINE

## 2021-12-22 PROCEDURE — M0245 HC IV INFUSION BAMLANIVIMAB & ETESEVIMAB W/MONITORING: HCPCS

## 2021-12-22 PROCEDURE — 2580000003 HC RX 258: Performed by: INTERNAL MEDICINE

## 2021-12-22 PROCEDURE — 99211 OFF/OP EST MAY X REQ PHY/QHP: CPT

## 2021-12-22 PROCEDURE — 2500000003 HC RX 250 WO HCPCS: Performed by: INTERNAL MEDICINE

## 2021-12-22 PROCEDURE — 94760 N-INVAS EAR/PLS OXIMETRY 1: CPT

## 2021-12-22 RX ORDER — METHYLPREDNISOLONE SODIUM SUCCINATE 125 MG/2ML
125 INJECTION, POWDER, LYOPHILIZED, FOR SOLUTION INTRAMUSCULAR; INTRAVENOUS
Status: ACTIVE | OUTPATIENT
Start: 2021-12-22 | End: 2021-12-22

## 2021-12-22 RX ORDER — SODIUM CHLORIDE 9 MG/ML
20 INJECTION, SOLUTION INTRAVENOUS CONTINUOUS PRN
Status: ACTIVE | OUTPATIENT
Start: 2021-12-22 | End: 2021-12-22

## 2021-12-22 RX ORDER — DIPHENHYDRAMINE HYDROCHLORIDE 50 MG/ML
50 INJECTION INTRAMUSCULAR; INTRAVENOUS
Status: ACTIVE | OUTPATIENT
Start: 2021-12-22 | End: 2021-12-22

## 2021-12-22 RX ORDER — EPINEPHRINE 1 MG/ML
0.3 INJECTION, SOLUTION, CONCENTRATE INTRAVENOUS PRN
Status: DISCONTINUED | OUTPATIENT
Start: 2021-12-22 | End: 2021-12-23 | Stop reason: HOSPADM

## 2021-12-22 RX ORDER — SODIUM CHLORIDE 9 MG/ML
100 INJECTION, SOLUTION INTRAVENOUS CONTINUOUS PRN
Status: DISCONTINUED | OUTPATIENT
Start: 2021-12-22 | End: 2021-12-23 | Stop reason: HOSPADM

## 2021-12-22 RX ADMIN — SODIUM CHLORIDE: 9 INJECTION, SOLUTION INTRAVENOUS at 11:04

## 2021-12-22 ASSESSMENT — PAIN SCALES - GENERAL
PAINLEVEL_OUTOF10: 0

## 2021-12-22 ASSESSMENT — PAIN - FUNCTIONAL ASSESSMENT: PAIN_FUNCTIONAL_ASSESSMENT: 0-10

## 2021-12-22 NOTE — PROGRESS NOTES
Pt tolerated BAM infusion well with no noted side effects. IV removed and pt was discharged ambulatory in baseline good condition.

## 2021-12-28 ENCOUNTER — CARE COORDINATION (OUTPATIENT)
Dept: CARE COORDINATION | Age: 86
End: 2021-12-28

## 2021-12-28 NOTE — CARE COORDINATION
You Patient resolved from the Care Transitions episode on 12/28/21  Discussed COVID-19 related testing which was available at this time. Test results were positive. Patient informed of results, if available? Yes    Patient/family has been provided the following resources and education related to COVID-19:                         Signs, symptoms and red flags related to COVID-19            CDC exposure and quarantine guidelines            Contact for their local Department of Health                 Patient currently reports that the following symptoms have improved:  No symptoms to report and no new/worsening symptoms     Summary  Outbound call made to patient to follow up on recent ED visit and being positive for COVID. Patient said he is not having any symptoms. Patients o2 last time checked was 99%. Patient was out of Quarantine as of yesterday. Patient denies having any other questions are concerns currently. No further outreach scheduled with this CTN/ACM. Episode of Care resolved. Patient has this CTN/ACM contact information if future needs arise.     [facility]  [phone]  [facility] [address]  [address] [place] [address] Primary Care

## 2022-05-23 ENCOUNTER — TELEPHONE (OUTPATIENT)
Dept: INTERNAL MEDICINE CLINIC | Age: 87
End: 2022-05-23

## 2022-05-23 NOTE — TELEPHONE ENCOUNTER
Spoke with patient , he states he is doing better each day . He went out for a drive today . He will let us know if he changes his mind.

## 2022-05-23 NOTE — TELEPHONE ENCOUNTER
Agree with plan. Please tell him he qualifies for anti viral oral medication that I can send to pharmacy (I know [facility] has oral dosing) which can improve symptoms and severity, do recommend. Let me know if he wants.

## 2022-05-23 NOTE — TELEPHONE ENCOUNTER
Patient stating he no longer has a cough and he has had no fever. He had his first symptom on Friday, 5/20. He states he has continued to take Tylenol, but will stop at this time d/t no symptoms. He was advised to quarantine for 5 days from his first symptom. He was also advised to call the office if symptoms worsen or to go to the ED. Patient voiced understanding.

## 2022-05-23 NOTE — TELEPHONE ENCOUNTER
Patient took a home COVID test that was positive. Low grade fever 98.8, dry cough that is gone. He's been taking Tylenol every 8 hours. He's feeling good, not sure if he needs to be seen or quarantine? Call passed to Fany Thomas for general questions.

## 2022-10-21 ENCOUNTER — NURSE ONLY (OUTPATIENT)
Dept: INTERNAL MEDICINE CLINIC | Age: 87
End: 2022-10-21
Payer: MEDICARE

## 2022-10-21 DIAGNOSIS — Z23 NEED FOR INFLUENZA VACCINATION: Primary | ICD-10-CM

## 2022-10-21 PROCEDURE — 99999 PR OFFICE/OUTPT VISIT,PROCEDURE ONLY: CPT | Performed by: NURSE PRACTITIONER

## 2022-10-21 PROCEDURE — G0008 ADMIN INFLUENZA VIRUS VAC: HCPCS | Performed by: NURSE PRACTITIONER

## 2022-10-21 PROCEDURE — 90694 VACC AIIV4 NO PRSRV 0.5ML IM: CPT | Performed by: NURSE PRACTITIONER

## 2022-10-31 ENCOUNTER — HOSPITAL ENCOUNTER (OUTPATIENT)
Age: 87
Discharge: HOME | End: 2022-10-31
Payer: MEDICARE

## 2022-10-31 PROCEDURE — 84154 ASSAY OF PSA FREE: CPT

## 2022-10-31 PROCEDURE — 84153 ASSAY OF PSA TOTAL: CPT

## 2022-11-03 LAB
PROSTATE SPECIFIC ANTIGEN FREE: 2 UG/L
PROSTATE SPECIFIC ANTIGEN PERCENT FREE: 35.1 %
PROSTATE SPECIFIC ANTIGEN: 5.7 UG/L (ref 0–4)

## 2022-12-06 ENCOUNTER — OFFICE VISIT (OUTPATIENT)
Dept: INTERNAL MEDICINE CLINIC | Age: 87
End: 2022-12-06
Payer: MEDICARE

## 2022-12-06 VITALS
SYSTOLIC BLOOD PRESSURE: 156 MMHG | DIASTOLIC BLOOD PRESSURE: 70 MMHG | TEMPERATURE: 97.2 F | HEIGHT: 68 IN | WEIGHT: 165 LBS | BODY MASS INDEX: 25.01 KG/M2

## 2022-12-06 DIAGNOSIS — Z00.00 MEDICARE ANNUAL WELLNESS VISIT, SUBSEQUENT: Primary | ICD-10-CM

## 2022-12-06 PROCEDURE — 1123F ACP DISCUSS/DSCN MKR DOCD: CPT | Performed by: NURSE PRACTITIONER

## 2022-12-06 PROCEDURE — G0439 PPPS, SUBSEQ VISIT: HCPCS | Performed by: NURSE PRACTITIONER

## 2022-12-06 SDOH — ECONOMIC STABILITY: FOOD INSECURITY: WITHIN THE PAST 12 MONTHS, YOU WORRIED THAT YOUR FOOD WOULD RUN OUT BEFORE YOU GOT MONEY TO BUY MORE.: NEVER TRUE

## 2022-12-06 SDOH — ECONOMIC STABILITY: FOOD INSECURITY: WITHIN THE PAST 12 MONTHS, THE FOOD YOU BOUGHT JUST DIDN'T LAST AND YOU DIDN'T HAVE MONEY TO GET MORE.: NEVER TRUE

## 2022-12-06 ASSESSMENT — LIFESTYLE VARIABLES
HOW OFTEN DO YOU HAVE A DRINK CONTAINING ALCOHOL: NEVER
HOW MANY STANDARD DRINKS CONTAINING ALCOHOL DO YOU HAVE ON A TYPICAL DAY: PATIENT DOES NOT DRINK

## 2022-12-06 ASSESSMENT — PATIENT HEALTH QUESTIONNAIRE - PHQ9
SUM OF ALL RESPONSES TO PHQ9 QUESTIONS 1 & 2: 0
SUM OF ALL RESPONSES TO PHQ QUESTIONS 1-9: 0
2. FEELING DOWN, DEPRESSED OR HOPELESS: 0
SUM OF ALL RESPONSES TO PHQ QUESTIONS 1-9: 0
1. LITTLE INTEREST OR PLEASURE IN DOING THINGS: 0
SUM OF ALL RESPONSES TO PHQ QUESTIONS 1-9: 0
SUM OF ALL RESPONSES TO PHQ QUESTIONS 1-9: 0

## 2022-12-06 ASSESSMENT — SOCIAL DETERMINANTS OF HEALTH (SDOH): HOW HARD IS IT FOR YOU TO PAY FOR THE VERY BASICS LIKE FOOD, HOUSING, MEDICAL CARE, AND HEATING?: NOT HARD AT ALL

## 2022-12-06 NOTE — PROGRESS NOTES
Medicare Annual Wellness Visit    Nilesh Barahona is here for Medicare AWV    Assessment & Plan   Medicare annual wellness visit, subsequent  Bring signed copy of AD for chart  Continue all current meds  Latest lab results are found in care everywhere as they were done at [facility]    Recommendations for Preventive Services Due: see orders and patient instructions/AVS.  Recommended screening schedule for the next 5-10 years is provided to the patient in written form: see Patient Instructions/AVS.     Return for Medicare Annual Wellness Visit in 1 year. Subjective   The following acute and/or chronic problems were also addressed today:  Heart problems are followed by Dr. Soni Lai  He is currently wearing a holter monitor for 30 days to evaluate intermittent dizziness    Patient's complete Health Risk Assessment and screening values have been reviewed and are found in Flowsheets. The following problems were reviewed today and where indicated follow up appointments were made and/or referrals ordered.     Positive Risk Factor Screenings with Interventions:             General Health and ACP:  General  In general, how would you say your health is?: Excellent  In the past 7 days, have you experienced any of the following: New or Increased Pain, New or Increased Fatigue, Loneliness, Social Isolation, Stress or Anger?: No  Do you get the social and emotional support that you need?: Yes  Do you have a Living Will?: Yes    Advance Directives       Power of  Living Will ACP-Advance Directive ACP-Power of     Not on File Not on File Not on File Not on File        General Health Risk Interventions:  Bring signed copy for chart      Safety:  Do you have working smoke detectors?: Yes  Do you have any tripping hazards - loose or unsecured carpets or rugs?: (!) Yes  Do you have any tripping hazards - clutter in doorways, halls, or stairs?: No  Do you have either shower bars, grab bars, non-slip mats or non-slip surfaces in your shower or bathtub?: Yes  Do all of your stairways have a railing or banister?: Yes  Do you always fasten your seatbelt when you are in a car?: Yes  Safety Interventions:  Patient declines any further evaluation/treatment for this issue           Objective   Vitals:    12/06/22 1041   BP: (!) 156/70   Temp: 97.2 °F (36.2 °C)   Weight: 165 lb (74.8 kg)   Height: 5' 8\" (1.727 m)      Body mass index is 25.09 kg/m². General Appearance: alert and oriented to person, place and time, well developed and well- nourished, in no acute distress  Skin: warm and dry, no rash or erythema  Head: normocephalic and atraumatic  Eyes: pupils equal, round, and reactive to light, extraocular eye movements intact, conjunctivae normal  ENT: tympanic membrane, external ear and ear canal normal bilaterally, nose without deformity, nasal mucosa and turbinates normal without polyps  Neck: supple and non-tender without mass, no thyromegaly or thyroid nodules, no cervical lymphadenopathy  Pulmonary/Chest: clear to auscultation bilaterally- no wheezes, rales or rhonchi, normal air movement, no respiratory distress  Cardiovascular: normal rate, regular rhythm, normal S1 and S2, no murmurs, rubs, clicks, or gallops, distal pulses intact, no carotid bruits  Abdomen: soft, non-tender, non-distended, normal bowel sounds, no masses or organomegaly  Extremities: no cyanosis, clubbing or edema  Musculoskeletal: normal range of motion, no joint swelling, deformity or tenderness  Neurologic: reflexes normal and symmetric, no cranial nerve deficit, gait, coordination and speech normal       Allergies   Allergen Reactions    Levaquin  [Levofloxacin In D5w]      Leg pain     Prior to Visit Medications    Medication Sig Taking?  Authorizing Provider   mirabegron (MYRBETRIQ) 25 MG TB24 Take 25 mg by mouth daily Yes Historical Provider, MD   tadalafil (CIALIS) 10 MG tablet Take 0.5 tablets by mouth daily Indications: BPH For BPH Yes Linus Martinez, APRN - CNP   Multiple Vitamins-Minerals (MULTIVITAMIN PO) Take by mouth daily Yes Historical Provider, MD   Multiple Vitamins-Minerals (PRESERVISION AREDS PO) Take by mouth daily Yes Historical Provider, MD   Calcium Citrate-Vitamin D (CITRACAL + D PO) Take by mouth daily Yes Historical Provider, MD   carvedilol (COREG) 12.5 MG tablet TAKE ONE TABLET BY MOUTH TWICE A DAY WITH FOOD Yes Historical Provider, MD   amLODIPine (NORVASC) 5 MG tablet Take 1 tablet by mouth daily. Patient taking differently: Take 5 mg by mouth daily Takes at night Yes Aime Terrell MD   aspirin 81 MG tablet Take 1 tablet by mouth daily. Yes Nani Garrett MD   lisinopril (PRINIVIL;ZESTRIL) 20 MG tablet Take 20 mg by mouth daily. Yes Historical Provider, MD   atorvastatin (LIPITOR) 40 MG tablet Take 40 mg by mouth daily.  Yes Historical Provider, MD       CareTeam (Including outside providers/suppliers regularly involved in providing care):   Patient Care Team:  MELECIO Mendieta CNP as PCP - General (Family Nurse Practitioner)  MELECIO Mendieta CNP as PCP - [facility] Empaneled Provider  Maru Rosa MD as Consulting Physician (Urology)  Kristin Rebollar MD as Consulting Physician (Cardiology)  Tea Rogers MD as Surgeon (Orthopedic Surgery)     Reviewed and updated this visit:  Allergies  Meds

## 2023-10-30 ENCOUNTER — NURSE ONLY (OUTPATIENT)
Dept: INTERNAL MEDICINE CLINIC | Age: 88
End: 2023-10-30
Payer: MEDICARE

## 2023-10-30 VITALS — TEMPERATURE: 97.7 F

## 2023-10-30 DIAGNOSIS — Z23 NEED FOR INFLUENZA VACCINATION: Primary | ICD-10-CM

## 2023-10-30 PROCEDURE — 90694 VACC AIIV4 NO PRSRV 0.5ML IM: CPT | Performed by: NURSE PRACTITIONER

## 2023-10-30 PROCEDURE — G0008 ADMIN INFLUENZA VIRUS VAC: HCPCS | Performed by: NURSE PRACTITIONER

## 2023-12-11 ENCOUNTER — OFFICE VISIT (OUTPATIENT)
Dept: INTERNAL MEDICINE CLINIC | Age: 88
End: 2023-12-11
Payer: MEDICARE

## 2023-12-11 VITALS
WEIGHT: 165 LBS | BODY MASS INDEX: 25.01 KG/M2 | TEMPERATURE: 99.1 F | OXYGEN SATURATION: 96 % | HEIGHT: 68 IN | HEART RATE: 76 BPM | DIASTOLIC BLOOD PRESSURE: 70 MMHG | SYSTOLIC BLOOD PRESSURE: 154 MMHG

## 2023-12-11 DIAGNOSIS — Z00.00 MEDICARE ANNUAL WELLNESS VISIT, SUBSEQUENT: Primary | ICD-10-CM

## 2023-12-11 DIAGNOSIS — H91.93 BILATERAL HEARING LOSS, UNSPECIFIED HEARING LOSS TYPE: ICD-10-CM

## 2023-12-11 DIAGNOSIS — G45.1 TIA INVOLVING RIGHT INTERNAL CAROTID ARTERY: ICD-10-CM

## 2023-12-11 DIAGNOSIS — I25.10 CAD IN NATIVE ARTERY: ICD-10-CM

## 2023-12-11 DIAGNOSIS — I10 MALIGNANT HYPERTENSION: ICD-10-CM

## 2023-12-11 DIAGNOSIS — H35.30 MACULAR DEGENERATION OF BOTH EYES, UNSPECIFIED TYPE: ICD-10-CM

## 2023-12-11 DIAGNOSIS — E78.5 DYSLIPIDEMIA: ICD-10-CM

## 2023-12-11 PROCEDURE — G0439 PPPS, SUBSEQ VISIT: HCPCS | Performed by: NURSE PRACTITIONER

## 2023-12-11 PROCEDURE — 1123F ACP DISCUSS/DSCN MKR DOCD: CPT | Performed by: NURSE PRACTITIONER

## 2023-12-11 SDOH — ECONOMIC STABILITY: INCOME INSECURITY: HOW HARD IS IT FOR YOU TO PAY FOR THE VERY BASICS LIKE FOOD, HOUSING, MEDICAL CARE, AND HEATING?: NOT HARD AT ALL

## 2023-12-11 SDOH — ECONOMIC STABILITY: FOOD INSECURITY: WITHIN THE PAST 12 MONTHS, YOU WORRIED THAT YOUR FOOD WOULD RUN OUT BEFORE YOU GOT MONEY TO BUY MORE.: NEVER TRUE

## 2023-12-11 SDOH — ECONOMIC STABILITY: FOOD INSECURITY: WITHIN THE PAST 12 MONTHS, THE FOOD YOU BOUGHT JUST DIDN'T LAST AND YOU DIDN'T HAVE MONEY TO GET MORE.: NEVER TRUE

## 2023-12-11 SDOH — ECONOMIC STABILITY: HOUSING INSECURITY
IN THE LAST 12 MONTHS, WAS THERE A TIME WHEN YOU DID NOT HAVE A STEADY PLACE TO SLEEP OR SLEPT IN A SHELTER (INCLUDING NOW)?: NO

## 2023-12-11 ASSESSMENT — PATIENT HEALTH QUESTIONNAIRE - PHQ9
SUM OF ALL RESPONSES TO PHQ QUESTIONS 1-9: 0
1. LITTLE INTEREST OR PLEASURE IN DOING THINGS: 0
2. FEELING DOWN, DEPRESSED OR HOPELESS: 0
SUM OF ALL RESPONSES TO PHQ QUESTIONS 1-9: 0
SUM OF ALL RESPONSES TO PHQ9 QUESTIONS 1 & 2: 0

## 2023-12-11 ASSESSMENT — LIFESTYLE VARIABLES
HOW MANY STANDARD DRINKS CONTAINING ALCOHOL DO YOU HAVE ON A TYPICAL DAY: 1 OR 2
HOW OFTEN DO YOU HAVE A DRINK CONTAINING ALCOHOL: MONTHLY OR LESS

## 2023-12-11 NOTE — PROGRESS NOTES
(CIALIS) 10 MG tablet Take 0.5 tablets by mouth daily Indications: BPH For BPH Yes Dennys Booth APRN - CNP   Multiple Vitamins-Minerals (MULTIVITAMIN PO) Take by mouth daily Yes Kimi Jones MD   Multiple Vitamins-Minerals (PRESERVISION AREDS PO) Take by mouth daily Yes Kimi Jones MD   Calcium Citrate-Vitamin D (CITRACAL + D PO) Take by mouth daily Yes ProviderKimi MD   carvedilol (COREG) 12.5 MG tablet TAKE ONE TABLET BY MOUTH TWICE A DAY WITH FOOD Yes ProviderKimi MD   amLODIPine (NORVASC) 5 MG tablet Take 1 tablet by mouth daily.   Patient taking differently: Take 1 tablet by mouth daily Takes at night Yes Avi Piper MD   aspirin 81 MG tablet Take 1 tablet by mouth daily Yes Ginger Pacheco MD   lisinopril (PRINIVIL;ZESTRIL) 20 MG tablet Take 1 tablet by mouth daily Yes Kimi Jones MD   atorvastatin (LIPITOR) 40 MG tablet Take 1 tablet by mouth daily Yes ProviderKimi MD       CareTeam (Including outside providers/suppliers regularly involved in providing care):   Patient Care Team:  MELECIO De La Vega CNP as PCP - General (Family Nurse Practitioner)  MELECIO De La Vega CNP as PCP - Empaneled Provider  Mago Duran MD as Consulting Physician (Urology)  Christin Blackman MD as Consulting Physician (Cardiology)  Una Blackman MD as Surgeon (Orthopedic Surgery)     Reviewed and updated this visit:  Tobacco  Allergies  Meds  Problems  Med Hx  Surg Hx  Soc Hx  Fam Hx

## 2023-12-14 PROBLEM — Z23 FLU VACCINE NEED: Status: RESOLVED | Noted: 2021-12-01 | Resolved: 2023-12-14

## 2023-12-14 PROBLEM — H35.30 MACULAR DEGENERATION OF BOTH EYES: Status: ACTIVE | Noted: 2023-12-14

## 2024-09-05 ENCOUNTER — ANCILLARY PROCEDURE (OUTPATIENT)
Dept: CARDIOLOGY CLINIC | Age: 88
End: 2024-09-05
Payer: MEDICARE

## 2024-09-05 ENCOUNTER — TELEPHONE (OUTPATIENT)
Dept: CARDIOLOGY CLINIC | Age: 89
End: 2024-09-05

## 2024-09-05 ENCOUNTER — APPOINTMENT (OUTPATIENT)
Dept: GENERAL RADIOLOGY | Age: 89
End: 2024-09-05
Payer: MEDICARE

## 2024-09-05 ENCOUNTER — HOSPITAL ENCOUNTER (EMERGENCY)
Age: 89
Discharge: HOME | End: 2024-09-05
Attending: EMERGENCY MEDICINE
Payer: MEDICARE

## 2024-09-05 VITALS
RESPIRATION RATE: 19 BRPM | HEART RATE: 68 BPM | OXYGEN SATURATION: 98 % | DIASTOLIC BLOOD PRESSURE: 65 MMHG | HEIGHT: 68 IN | TEMPERATURE: 98.5 F | WEIGHT: 164 LBS | SYSTOLIC BLOOD PRESSURE: 174 MMHG | BODY MASS INDEX: 24.86 KG/M2

## 2024-09-05 DIAGNOSIS — R00.0 TACHYCARDIA: ICD-10-CM

## 2024-09-05 DIAGNOSIS — I47.10 SVT (SUPRAVENTRICULAR TACHYCARDIA) (HCC): Primary | ICD-10-CM

## 2024-09-05 DIAGNOSIS — I47.19 ATRIAL TACHYCARDIA (HCC): Primary | ICD-10-CM

## 2024-09-05 DIAGNOSIS — I47.10 SVT (SUPRAVENTRICULAR TACHYCARDIA): ICD-10-CM

## 2024-09-05 LAB
ALBUMIN SERPL-MCNC: 4.3 G/DL (ref 3.4–5)
ALBUMIN/GLOB SERPL: 1.5 {RATIO} (ref 1.1–2.2)
ALP SERPL-CCNC: 95 U/L (ref 40–129)
ALT SERPL-CCNC: 10 U/L (ref 10–40)
ANION GAP SERPL CALCULATED.3IONS-SCNC: 11 MMOL/L (ref 3–16)
AST SERPL-CCNC: 17 U/L (ref 15–37)
BASOPHILS # BLD: 0 K/UL (ref 0–0.2)
BASOPHILS NFR BLD: 0.2 %
BILIRUB SERPL-MCNC: 0.4 MG/DL (ref 0–1)
BUN SERPL-MCNC: 21 MG/DL (ref 7–20)
CALCIUM SERPL-MCNC: 8.9 MG/DL (ref 8.3–10.6)
CHLORIDE SERPL-SCNC: 103 MMOL/L (ref 99–110)
CO2 SERPL-SCNC: 22 MMOL/L (ref 21–32)
CREAT SERPL-MCNC: 1.6 MG/DL (ref 0.8–1.3)
DEPRECATED RDW RBC AUTO: 13.9 % (ref 12.4–15.4)
EKG ATRIAL RATE: 77 BPM
EKG DIAGNOSIS: NORMAL
EKG DIAGNOSIS: NORMAL
EKG P AXIS: 39 DEGREES
EKG P-R INTERVAL: 170 MS
EKG Q-T INTERVAL: 330 MS
EKG Q-T INTERVAL: 396 MS
EKG QRS DURATION: 136 MS
EKG QRS DURATION: 138 MS
EKG QTC CALCULATION (BAZETT): 448 MS
EKG QTC CALCULATION (BAZETT): 518 MS
EKG R AXIS: -18 DEGREES
EKG R AXIS: 229 DEGREES
EKG T AXIS: -2 DEGREES
EKG T AXIS: 9 DEGREES
EKG VENTRICULAR RATE: 148 BPM
EKG VENTRICULAR RATE: 77 BPM
EOSINOPHIL # BLD: 0.1 K/UL (ref 0–0.6)
EOSINOPHIL NFR BLD: 0.7 %
GFR SERPLBLD CREATININE-BSD FMLA CKD-EPI: 41 ML/MIN/{1.73_M2}
GLUCOSE SERPL-MCNC: 110 MG/DL (ref 70–99)
HCT VFR BLD AUTO: 40.1 % (ref 40.5–52.5)
HGB BLD-MCNC: 13.2 G/DL (ref 13.5–17.5)
INR PPP: 1.1 (ref 0.85–1.15)
LYMPHOCYTES # BLD: 1.9 K/UL (ref 1–5.1)
LYMPHOCYTES NFR BLD: 21 %
MCH RBC QN AUTO: 30.1 PG (ref 26–34)
MCHC RBC AUTO-ENTMCNC: 32.8 G/DL (ref 31–36)
MCV RBC AUTO: 91.6 FL (ref 80–100)
MONOCYTES # BLD: 1 K/UL (ref 0–1.3)
MONOCYTES NFR BLD: 10.9 %
NEUTROPHILS # BLD: 6.1 K/UL (ref 1.7–7.7)
NEUTROPHILS NFR BLD: 67.2 %
PLATELET # BLD AUTO: 145 K/UL (ref 135–450)
PMV BLD AUTO: 8.3 FL (ref 5–10.5)
POTASSIUM SERPL-SCNC: 4 MMOL/L (ref 3.5–5.1)
PROT SERPL-MCNC: 7.2 G/DL (ref 6.4–8.2)
PROTHROMBIN TIME: 14.4 SEC (ref 11.9–14.9)
RBC # BLD AUTO: 4.38 M/UL (ref 4.2–5.9)
SODIUM SERPL-SCNC: 136 MMOL/L (ref 136–145)
TROPONIN, HIGH SENSITIVITY: 12 NG/L (ref 0–22)
WBC # BLD AUTO: 9.1 K/UL (ref 4–11)

## 2024-09-05 PROCEDURE — 93010 ELECTROCARDIOGRAM REPORT: CPT | Performed by: INTERNAL MEDICINE

## 2024-09-05 PROCEDURE — 96365 THER/PROPH/DIAG IV INF INIT: CPT

## 2024-09-05 PROCEDURE — 84484 ASSAY OF TROPONIN QUANT: CPT

## 2024-09-05 PROCEDURE — 93005 ELECTROCARDIOGRAM TRACING: CPT | Performed by: EMERGENCY MEDICINE

## 2024-09-05 PROCEDURE — 99285 EMERGENCY DEPT VISIT HI MDM: CPT

## 2024-09-05 PROCEDURE — 80053 COMPREHEN METABOLIC PANEL: CPT

## 2024-09-05 PROCEDURE — 6370000000 HC RX 637 (ALT 250 FOR IP): Performed by: INTERNAL MEDICINE

## 2024-09-05 PROCEDURE — 36415 COLL VENOUS BLD VENIPUNCTURE: CPT

## 2024-09-05 PROCEDURE — 99223 1ST HOSP IP/OBS HIGH 75: CPT | Performed by: INTERNAL MEDICINE

## 2024-09-05 PROCEDURE — 71045 X-RAY EXAM CHEST 1 VIEW: CPT

## 2024-09-05 PROCEDURE — 85025 COMPLETE CBC W/AUTO DIFF WBC: CPT

## 2024-09-05 PROCEDURE — 2580000003 HC RX 258: Performed by: EMERGENCY MEDICINE

## 2024-09-05 PROCEDURE — 85610 PROTHROMBIN TIME: CPT

## 2024-09-05 PROCEDURE — 6360000002 HC RX W HCPCS: Performed by: EMERGENCY MEDICINE

## 2024-09-05 RX ORDER — MAGNESIUM SULFATE IN WATER 40 MG/ML
2000 INJECTION, SOLUTION INTRAVENOUS ONCE
Status: COMPLETED | OUTPATIENT
Start: 2024-09-05 | End: 2024-09-05

## 2024-09-05 RX ORDER — MAGNESIUM OXIDE 400 MG/1
400 TABLET ORAL DAILY
Qty: 30 TABLET | Refills: 1 | Status: SHIPPED | OUTPATIENT
Start: 2024-09-05

## 2024-09-05 RX ORDER — 0.9 % SODIUM CHLORIDE 0.9 %
500 INTRAVENOUS SOLUTION INTRAVENOUS ONCE
Status: COMPLETED | OUTPATIENT
Start: 2024-09-05 | End: 2024-09-05

## 2024-09-05 RX ORDER — LANOLIN ALCOHOL/MO/W.PET/CERES
400 CREAM (GRAM) TOPICAL DAILY
Status: DISCONTINUED | OUTPATIENT
Start: 2024-09-05 | End: 2024-09-05 | Stop reason: HOSPADM

## 2024-09-05 RX ADMIN — Medication 400 MG: at 13:51

## 2024-09-05 RX ADMIN — SODIUM CHLORIDE 500 ML: 9 INJECTION, SOLUTION INTRAVENOUS at 09:59

## 2024-09-05 RX ADMIN — MAGNESIUM SULFATE HEPTAHYDRATE 2000 MG: 40 INJECTION, SOLUTION INTRAVENOUS at 10:01

## 2024-09-05 ASSESSMENT — PAIN SCALES - GENERAL: PAINLEVEL_OUTOF10: 0

## 2024-09-05 ASSESSMENT — PAIN - FUNCTIONAL ASSESSMENT: PAIN_FUNCTIONAL_ASSESSMENT: 0-10

## 2024-09-05 NOTE — ED NOTES
Walked over to cards,  mentioned  to call EP   @[phone] Called cards to [facility]   RE:tachycardia, Atrial w/ abberancy?  @[phone] Allan called back and spoke to

## 2024-09-05 NOTE — ED PROVIDER NOTES
Emergency Department Provider Note  Location: [facility]  ED  9/5/2024     Patient Identification  José Gonzalez is a 88 y.o. male    Chief Complaint  Tachycardia (Pt reports he was taking his morning pulse vitals because he had chills last night. States his heart rate was anywhere between 135 and 152. Pt denies any pain. States he feels a \"pounding\" from time to time but no pain. Denies any hx of a fib. )          HPI  (History provided by patient)  Patient is an 88-year-old male with a functioning who presents for concerns for elevated heart rate when he checked his vital signs this morning.  He does this routinely.  Noticed his heart rate was as high as 150s.  Denies any palpitations chest pain shortness of breath or lightheadedness.  He did endorse chills yesterday evening but otherwise felt well and at his baseline.  He woke up was planning to go to the gym but notices heart rate elevated and presents for this reason.  Denies any recent medication changes other than starting Gemtesa few weeks ago no recent dehydration no significant alcohol use or recent illnesses.  Denies history of thyroid disease.      Nursing Notes were all reviewed and agreed with, or any disagreements were addressed in the HPI:  Allergies:   Allergies   Allergen Reactions    Levaquin  [Levofloxacin In D5w]      Leg pain       Past medical history:  has a past medical history of BPH (benign prostatic hyperplasia), CAD (coronary artery disease), Colon polyps (1990), History of PTCA, Hyperlipidemia, Hypertension, Influenza A (01/09/2018), RBBB, and Vertigo.    Past surgical history:  has a past surgical history that includes Coronary angioplasty with stent (2010 2013); other surgical history (1955); Colonoscopy (02/27/90 05/20/05); Colonoscopy (7/28/15); Hand surgery (Left, 03/02/2017); Finger surgery (Right, 1946); cyst removal (1956); Intracapsular cataract extraction (Left, 4/16/2019); and Intracapsular cataract extraction

## 2024-09-05 NOTE — CARE COORDINATION
Case Management Assessment  Initial Evaluation    Date/Time of Evaluation: 9/5/2024 10:26 AM  Assessment Completed by: Ngozi Alaniz RN    If patient is discharged prior to next notation, then this note serves as note for discharge by case management.    Patient Name: José Gonzalez                   YOB: 1935  Diagnosis: No admission diagnoses are documented for this encounter.                   Date / Time: 9/5/2024  9:28 AM    Patient Admission Status: Emergency   Readmission Risk (Low < 19, Mod (19-27), High > 27): No data recorded  Current PCP: Alxe Booth APRN - CNP  PCP verified by CM? Yes    Chart Reviewed: Yes      History Provided by: Patient  Patient Orientation: Alert and Oriented, Person, Place, Situation    Patient Cognition: Alert    Hospitalization in the last 30 days (Readmission):  No    If yes, Readmission Assessment in  Navigator will be completed.    Advance Directives:    Code Status: Prior   Patient's Primary Decision Maker is: Legal Next of Kin    Primary Decision Maker: Keeley Mcrae  Child - [phone]    Secondary Decision Maker: Cait Anderson  Child  [phone]    Discharge Planning:  Patient lives with: Alone Type of Home: House  Primary Care Giver: Self  Patient Support Systems include: Children, Family Members   Current Financial resources: Medicare  Current community resources: None  Current services prior to admission: None            Current DME: none, pt active, walks a lot             Type of Home Care services:  None    ADLS  Prior functional level: Independent in ADLs/IADLs  Current functional level: Other (see comment) (in ED, appears baseline)    Family can provide assistance at DC: Yes  Would you like Case Management to discuss the discharge plan with any other family members/significant others, and if so, who? No  Plans to Return to Present Housing: Yes  Other Identified Issues/Barriers to RETURNING to current housing: none noted by pt during

## 2024-09-05 NOTE — TELEPHONE ENCOUNTER
Monitor company [employer]  Length of monitor 30 days  Monitor ordered by AGK  Patch ID [id number], [id number], [id number], [id number]  Monitor given to PATIENT  Activation successful prior to pt leaving hospital? Yes  Instructions given to PATIENT.  PATIENT verbalized understanding.  All questions answered to the best of my ability.

## 2024-10-16 LAB — ECHO BSA: 1.89 M2

## 2024-10-16 PROCEDURE — 93228 REMOTE 30 DAY ECG REV/REPORT: CPT | Performed by: INTERNAL MEDICINE

## 2024-11-08 ENCOUNTER — NURSE ONLY (OUTPATIENT)
Dept: INTERNAL MEDICINE CLINIC | Age: 88
End: 2024-11-08
Payer: MEDICARE

## 2024-11-08 DIAGNOSIS — Z23 NEED FOR INFLUENZA VACCINATION: Primary | ICD-10-CM

## 2024-11-08 PROCEDURE — 90653 IIV ADJUVANT VACCINE IM: CPT | Performed by: NURSE PRACTITIONER

## 2024-11-08 PROCEDURE — G0008 ADMIN INFLUENZA VIRUS VAC: HCPCS | Performed by: NURSE PRACTITIONER

## 2025-02-03 LAB
ALBUMIN: 3.8 G/DL (ref 3.5–5)
ANION GAP SERPL CALCULATED.3IONS-SCNC: 7 MMOL/L (ref 5–13)
BACTERIA: ABNORMAL /HPF
BILIRUBIN, URINE: NEGATIVE
BLOOD, URINE: ABNORMAL
BUN / CREAT RATIO: 17
BUN BLDV-MCNC: 20 MG/DL (ref 7–25)
CALCIUM SERPL-MCNC: 8.5 MG/DL (ref 8.5–10.5)
CHLORIDE BLD-SCNC: 110 MMOL/L (ref 98–110)
CLARITY, UA: ABNORMAL
CO2: 22 MMOL/L (ref 22–29)
COLOR, UA: YELLOW
CREAT SERPL-MCNC: 1.17 MG/DL (ref 0.5–1.3)
EGFR (CKD-EPI): 60 SEE NOTE
GLUCOSE BLD-MCNC: 97 MG/DL (ref 71–99)
GLUCOSE URINE: NEGATIVE MG/DL
HCT VFR BLD CALC: 41.3 % (ref 40–51)
HEMOGLOBIN: 13.4 G/DL (ref 13.2–17.1)
KETONES, URINE: NEGATIVE MG/DL
LEUKOCYTE CLUMPS, URINE: ABNORMAL /HPF
LEUKOCYTE ESTERASE, URINE: ABNORMAL
LEUKOCYTES, BLD: 7.05 10*3/UL (ref 4–12)
LEUKOCYTES, UA: >100 /HPF (ref 0–5)
MCH RBC QN AUTO: 30.4 PG (ref 27–33)
MCHC RBC AUTO-ENTMCNC: 32.4 G/DL (ref 30–36)
MCV RBC AUTO: 93.7 FL (ref 80–100)
NITRITE, URINE: NEGATIVE
PDW BLD-RTO: 12.5 % (ref 11–15)
PH, URINE: 7 (ref 5–8)
PHOSPHORUS: 3.1 MG/DL (ref 2.1–4.3)
PLATELET # BLD: 183 10*3/UL (ref 140–400)
PMV BLD AUTO: 10.3 FL (ref 9–13)
POTASSIUM SERPL-SCNC: 4.5 MMOL/L (ref 3.5–5.1)
PROTEIN UA: 30 MG/DL
RBC # BLD: 4.41 10*6/UL (ref 4.2–5.8)
RBC UA: 49 /HPF (ref 0–3)
SODIUM BLD-SCNC: 139 MMOL/L (ref 135–146)
SPECIFIC GRAVITY UA: 1.01 (ref 1–1.03)
UROBILINOGEN, URINE: <2 MG/DL

## 2025-04-07 ENCOUNTER — HOSPITAL ENCOUNTER (INPATIENT)
Age: 89
LOS: 1 days | Discharge: HOME | DRG: 728 | End: 2025-04-08
Attending: EMERGENCY MEDICINE | Admitting: HOSPITALIST
Payer: MEDICARE

## 2025-04-07 ENCOUNTER — APPOINTMENT (OUTPATIENT)
Dept: ULTRASOUND IMAGING | Age: 89
DRG: 728 | End: 2025-04-07
Payer: MEDICARE

## 2025-04-07 DIAGNOSIS — R82.81 PYURIA: ICD-10-CM

## 2025-04-07 DIAGNOSIS — N49.2 CELLULITIS OF SCROTUM: ICD-10-CM

## 2025-04-07 DIAGNOSIS — N45.2 ORCHITIS: Primary | ICD-10-CM

## 2025-04-07 LAB
ALBUMIN SERPL-MCNC: 4.1 G/DL (ref 3.4–5)
ALBUMIN/GLOB SERPL: 1.5 {RATIO} (ref 1.1–2.2)
ALP SERPL-CCNC: 83 U/L (ref 40–129)
ALT SERPL-CCNC: 12 U/L (ref 10–40)
ANION GAP SERPL CALCULATED.3IONS-SCNC: 10 MMOL/L (ref 3–16)
AST SERPL-CCNC: 15 U/L (ref 15–37)
BACTERIA URNS QL MICRO: ABNORMAL /HPF
BASOPHILS # BLD: 0 K/UL (ref 0–0.2)
BASOPHILS NFR BLD: 0.2 %
BILIRUB SERPL-MCNC: 0.7 MG/DL (ref 0–1)
BILIRUB UR QL STRIP.AUTO: NEGATIVE
BILIRUB UR QL STRIP.AUTO: NEGATIVE
BUN SERPL-MCNC: 16 MG/DL (ref 7–20)
CALCIUM SERPL-MCNC: 8.8 MG/DL (ref 8.3–10.6)
CHLORIDE SERPL-SCNC: 104 MMOL/L (ref 99–110)
CLARITY UR: CLEAR
CLARITY UR: CLEAR
CO2 SERPL-SCNC: 22 MMOL/L (ref 21–32)
COLOR UR: YELLOW
COLOR UR: YELLOW
CREAT SERPL-MCNC: 1.1 MG/DL (ref 0.8–1.3)
DEPRECATED RDW RBC AUTO: 14.2 % (ref 12.4–15.4)
EOSINOPHIL # BLD: 0.1 K/UL (ref 0–0.6)
EOSINOPHIL NFR BLD: 0.5 %
EPI CELLS #/AREA URNS HPF: ABNORMAL /HPF (ref 0–5)
GFR SERPLBLD CREATININE-BSD FMLA CKD-EPI: 64 ML/MIN/{1.73_M2}
GLUCOSE SERPL-MCNC: 107 MG/DL (ref 70–99)
GLUCOSE UR STRIP.AUTO-MCNC: NEGATIVE MG/DL
GLUCOSE UR STRIP.AUTO-MCNC: NEGATIVE MG/DL
HCT VFR BLD AUTO: 40 % (ref 40.5–52.5)
HGB BLD-MCNC: 13.5 G/DL (ref 13.5–17.5)
HGB UR QL STRIP.AUTO: ABNORMAL
HGB UR QL STRIP.AUTO: ABNORMAL
KETONES UR STRIP.AUTO-MCNC: NEGATIVE MG/DL
KETONES UR STRIP.AUTO-MCNC: NEGATIVE MG/DL
LEUKOCYTE ESTERASE UR QL STRIP.AUTO: ABNORMAL
LEUKOCYTE ESTERASE UR QL STRIP.AUTO: ABNORMAL
LYMPHOCYTES # BLD: 2.2 K/UL (ref 1–5.1)
LYMPHOCYTES NFR BLD: 19.7 %
MCH RBC QN AUTO: 30.7 PG (ref 26–34)
MCHC RBC AUTO-ENTMCNC: 33.9 G/DL (ref 31–36)
MCV RBC AUTO: 90.8 FL (ref 80–100)
MONOCYTES # BLD: 1 K/UL (ref 0–1.3)
MONOCYTES NFR BLD: 9 %
NEUTROPHILS # BLD: 7.9 K/UL (ref 1.7–7.7)
NEUTROPHILS NFR BLD: 70.6 %
NITRITE UR QL STRIP.AUTO: NEGATIVE
NITRITE UR QL STRIP.AUTO: NEGATIVE
PH UR STRIP.AUTO: 7 [PH] (ref 5–8)
PH UR STRIP.AUTO: 7 [PH] (ref 5–8)
PLATELET # BLD AUTO: 141 K/UL (ref 135–450)
PMV BLD AUTO: 7.8 FL (ref 5–10.5)
POTASSIUM SERPL-SCNC: 4.2 MMOL/L (ref 3.5–5.1)
PROT SERPL-MCNC: 6.8 G/DL (ref 6.4–8.2)
PROT UR STRIP.AUTO-MCNC: NEGATIVE MG/DL
PROT UR STRIP.AUTO-MCNC: NEGATIVE MG/DL
RBC # BLD AUTO: 4.4 M/UL (ref 4.2–5.9)
RBC #/AREA URNS HPF: ABNORMAL /HPF (ref 0–4)
RBC #/AREA URNS HPF: ABNORMAL /HPF (ref 0–4)
SODIUM SERPL-SCNC: 136 MMOL/L (ref 136–145)
SP GR UR STRIP.AUTO: 1.01 (ref 1–1.03)
SP GR UR STRIP.AUTO: 1.01 (ref 1–1.03)
UA COMPLETE W REFLEX CULTURE PNL UR: YES
UA DIPSTICK W REFLEX MICRO PNL UR: YES
UA DIPSTICK W REFLEX MICRO PNL UR: YES
URN SPEC COLLECT METH UR: ABNORMAL
URN SPEC COLLECT METH UR: ABNORMAL
UROBILINOGEN UR STRIP-ACNC: 0.2 E.U./DL
UROBILINOGEN UR STRIP-ACNC: 0.2 E.U./DL
WBC # BLD AUTO: 11.2 K/UL (ref 4–11)
WBC #/AREA URNS HPF: >100 /HPF (ref 0–5)
WBC #/AREA URNS HPF: >100 /HPF (ref 0–5)

## 2025-04-07 PROCEDURE — 6370000000 HC RX 637 (ALT 250 FOR IP)

## 2025-04-07 PROCEDURE — 2580000003 HC RX 258

## 2025-04-07 PROCEDURE — 1200000000 HC SEMI PRIVATE

## 2025-04-07 PROCEDURE — 2580000003 HC RX 258: Performed by: EMERGENCY MEDICINE

## 2025-04-07 PROCEDURE — 2580000003 HC RX 258: Performed by: HOSPITALIST

## 2025-04-07 PROCEDURE — 6360000002 HC RX W HCPCS

## 2025-04-07 PROCEDURE — 93975 VASCULAR STUDY: CPT

## 2025-04-07 PROCEDURE — 87186 SC STD MICRODIL/AGAR DIL: CPT

## 2025-04-07 PROCEDURE — 6360000002 HC RX W HCPCS: Performed by: EMERGENCY MEDICINE

## 2025-04-07 PROCEDURE — 99285 EMERGENCY DEPT VISIT HI MDM: CPT

## 2025-04-07 PROCEDURE — 87077 CULTURE AEROBIC IDENTIFY: CPT

## 2025-04-07 PROCEDURE — 85025 COMPLETE CBC W/AUTO DIFF WBC: CPT

## 2025-04-07 PROCEDURE — 81001 URINALYSIS AUTO W/SCOPE: CPT

## 2025-04-07 PROCEDURE — 2500000003 HC RX 250 WO HCPCS

## 2025-04-07 PROCEDURE — 36415 COLL VENOUS BLD VENIPUNCTURE: CPT

## 2025-04-07 PROCEDURE — 80053 COMPREHEN METABOLIC PANEL: CPT

## 2025-04-07 PROCEDURE — 6370000000 HC RX 637 (ALT 250 FOR IP): Performed by: HOSPITALIST

## 2025-04-07 PROCEDURE — 87086 URINE CULTURE/COLONY COUNT: CPT

## 2025-04-07 RX ORDER — DOXYCYCLINE HYCLATE 100 MG
100 TABLET ORAL EVERY 12 HOURS SCHEDULED
Status: DISCONTINUED | OUTPATIENT
Start: 2025-04-07 | End: 2025-04-08 | Stop reason: HOSPADM

## 2025-04-07 RX ORDER — POLYETHYLENE GLYCOL 3350 17 G/17G
17 POWDER, FOR SOLUTION ORAL DAILY PRN
Status: DISCONTINUED | OUTPATIENT
Start: 2025-04-07 | End: 2025-04-08 | Stop reason: HOSPADM

## 2025-04-07 RX ORDER — SODIUM CHLORIDE 0.9 % (FLUSH) 0.9 %
5-40 SYRINGE (ML) INJECTION EVERY 12 HOURS SCHEDULED
Status: DISCONTINUED | OUTPATIENT
Start: 2025-04-07 | End: 2025-04-08 | Stop reason: HOSPADM

## 2025-04-07 RX ORDER — PANTOPRAZOLE SODIUM 40 MG/1
40 TABLET, DELAYED RELEASE ORAL
Status: DISCONTINUED | OUTPATIENT
Start: 2025-04-08 | End: 2025-04-08 | Stop reason: HOSPADM

## 2025-04-07 RX ORDER — CEPHALEXIN 500 MG/1
500 CAPSULE ORAL 2 TIMES DAILY
Status: ON HOLD | COMMUNITY
End: 2025-04-08 | Stop reason: HOSPADM

## 2025-04-07 RX ORDER — ACETAMINOPHEN 650 MG/1
650 SUPPOSITORY RECTAL EVERY 6 HOURS PRN
Status: DISCONTINUED | OUTPATIENT
Start: 2025-04-07 | End: 2025-04-08 | Stop reason: HOSPADM

## 2025-04-07 RX ORDER — VANCOMYCIN 500 MG/100ML
500 INJECTION, SOLUTION INTRAVENOUS EVERY 12 HOURS
Status: DISCONTINUED | OUTPATIENT
Start: 2025-04-07 | End: 2025-04-07

## 2025-04-07 RX ORDER — SODIUM CHLORIDE 0.9 % (FLUSH) 0.9 %
5-40 SYRINGE (ML) INJECTION PRN
Status: DISCONTINUED | OUTPATIENT
Start: 2025-04-07 | End: 2025-04-08 | Stop reason: HOSPADM

## 2025-04-07 RX ORDER — SODIUM CHLORIDE 9 MG/ML
INJECTION, SOLUTION INTRAVENOUS PRN
Status: DISCONTINUED | OUTPATIENT
Start: 2025-04-07 | End: 2025-04-08 | Stop reason: HOSPADM

## 2025-04-07 RX ORDER — POTASSIUM CHLORIDE 1500 MG/1
40 TABLET, EXTENDED RELEASE ORAL PRN
Status: DISCONTINUED | OUTPATIENT
Start: 2025-04-07 | End: 2025-04-08 | Stop reason: HOSPADM

## 2025-04-07 RX ORDER — M-VIT,TX,IRON,MINS/CALC/FOLIC 27MG-0.4MG
1 TABLET ORAL DAILY
Status: DISCONTINUED | OUTPATIENT
Start: 2025-04-07 | End: 2025-04-08 | Stop reason: HOSPADM

## 2025-04-07 RX ORDER — ENOXAPARIN SODIUM 100 MG/ML
40 INJECTION SUBCUTANEOUS DAILY
Status: DISCONTINUED | OUTPATIENT
Start: 2025-04-08 | End: 2025-04-07

## 2025-04-07 RX ORDER — MAGNESIUM SULFATE IN WATER 40 MG/ML
2000 INJECTION, SOLUTION INTRAVENOUS PRN
Status: DISCONTINUED | OUTPATIENT
Start: 2025-04-07 | End: 2025-04-08 | Stop reason: HOSPADM

## 2025-04-07 RX ORDER — ATORVASTATIN CALCIUM 40 MG/1
40 TABLET, FILM COATED ORAL NIGHTLY
Status: DISCONTINUED | OUTPATIENT
Start: 2025-04-07 | End: 2025-04-08 | Stop reason: HOSPADM

## 2025-04-07 RX ORDER — ONDANSETRON 4 MG/1
4 TABLET, ORALLY DISINTEGRATING ORAL EVERY 8 HOURS PRN
Status: DISCONTINUED | OUTPATIENT
Start: 2025-04-07 | End: 2025-04-08 | Stop reason: HOSPADM

## 2025-04-07 RX ORDER — SODIUM CHLORIDE, SODIUM LACTATE, POTASSIUM CHLORIDE, CALCIUM CHLORIDE 600; 310; 30; 20 MG/100ML; MG/100ML; MG/100ML; MG/100ML
INJECTION, SOLUTION INTRAVENOUS CONTINUOUS
Status: ACTIVE | OUTPATIENT
Start: 2025-04-07 | End: 2025-04-08

## 2025-04-07 RX ORDER — AMLODIPINE BESYLATE 5 MG/1
5 TABLET ORAL DAILY
Status: DISCONTINUED | OUTPATIENT
Start: 2025-04-07 | End: 2025-04-08 | Stop reason: HOSPADM

## 2025-04-07 RX ORDER — ACETAMINOPHEN 325 MG/1
650 TABLET ORAL EVERY 6 HOURS PRN
Status: DISCONTINUED | OUTPATIENT
Start: 2025-04-07 | End: 2025-04-08 | Stop reason: HOSPADM

## 2025-04-07 RX ORDER — POTASSIUM CHLORIDE 7.45 MG/ML
10 INJECTION INTRAVENOUS PRN
Status: DISCONTINUED | OUTPATIENT
Start: 2025-04-07 | End: 2025-04-08 | Stop reason: HOSPADM

## 2025-04-07 RX ORDER — OXYCODONE AND ACETAMINOPHEN 5; 325 MG/1; MG/1
1 TABLET ORAL EVERY 4 HOURS PRN
Refills: 0 | Status: DISCONTINUED | OUTPATIENT
Start: 2025-04-07 | End: 2025-04-08 | Stop reason: HOSPADM

## 2025-04-07 RX ORDER — ONDANSETRON 2 MG/ML
4 INJECTION INTRAMUSCULAR; INTRAVENOUS EVERY 6 HOURS PRN
Status: DISCONTINUED | OUTPATIENT
Start: 2025-04-07 | End: 2025-04-08 | Stop reason: HOSPADM

## 2025-04-07 RX ORDER — LISINOPRIL 20 MG/1
20 TABLET ORAL DAILY
Status: DISCONTINUED | OUTPATIENT
Start: 2025-04-07 | End: 2025-04-08 | Stop reason: HOSPADM

## 2025-04-07 RX ORDER — KETOROLAC TROMETHAMINE 30 MG/ML
15 INJECTION, SOLUTION INTRAMUSCULAR; INTRAVENOUS EVERY 6 HOURS PRN
Status: DISCONTINUED | OUTPATIENT
Start: 2025-04-07 | End: 2025-04-08 | Stop reason: HOSPADM

## 2025-04-07 RX ADMIN — VANCOMYCIN HYDROCHLORIDE 1500 MG: 10 INJECTION, POWDER, LYOPHILIZED, FOR SOLUTION INTRAVENOUS at 07:24

## 2025-04-07 RX ADMIN — KETOROLAC TROMETHAMINE 15 MG: 30 INJECTION, SOLUTION INTRAMUSCULAR at 17:23

## 2025-04-07 RX ADMIN — SODIUM CHLORIDE, SODIUM LACTATE, POTASSIUM CHLORIDE, AND CALCIUM CHLORIDE: .6; .31; .03; .02 INJECTION, SOLUTION INTRAVENOUS at 06:41

## 2025-04-07 RX ADMIN — ATORVASTATIN CALCIUM 40 MG: 40 TABLET, FILM COATED ORAL at 20:21

## 2025-04-07 RX ADMIN — SODIUM CHLORIDE, SODIUM LACTATE, POTASSIUM CHLORIDE, AND CALCIUM CHLORIDE: .6; .31; .03; .02 INJECTION, SOLUTION INTRAVENOUS at 18:47

## 2025-04-07 RX ADMIN — ACETAMINOPHEN 650 MG: 325 TABLET ORAL at 11:48

## 2025-04-07 RX ADMIN — CEFEPIME 1000 MG: 1 INJECTION, POWDER, FOR SOLUTION INTRAMUSCULAR; INTRAVENOUS at 06:45

## 2025-04-07 RX ADMIN — DOXYCYCLINE HYCLATE 100 MG: 100 TABLET, COATED ORAL at 20:31

## 2025-04-07 RX ADMIN — LISINOPRIL 20 MG: 20 TABLET ORAL at 14:03

## 2025-04-07 RX ADMIN — Medication 1 TABLET: at 14:03

## 2025-04-07 RX ADMIN — WATER 2000 MG: 1 INJECTION INTRAMUSCULAR; INTRAVENOUS; SUBCUTANEOUS at 14:03

## 2025-04-07 RX ADMIN — AMLODIPINE BESYLATE 5 MG: 5 TABLET ORAL at 14:03

## 2025-04-07 ASSESSMENT — PAIN DESCRIPTION - LOCATION
LOCATION: GROIN
LOCATION: SCROTUM
LOCATION: OTHER (COMMENT)
LOCATION: GROIN
LOCATION: OTHER (COMMENT)
LOCATION: GROIN

## 2025-04-07 ASSESSMENT — PAIN DESCRIPTION - ORIENTATION
ORIENTATION: RIGHT

## 2025-04-07 ASSESSMENT — PAIN - FUNCTIONAL ASSESSMENT
PAIN_FUNCTIONAL_ASSESSMENT: ACTIVITIES ARE NOT PREVENTED
PAIN_FUNCTIONAL_ASSESSMENT: ACTIVITIES ARE NOT PREVENTED
PAIN_FUNCTIONAL_ASSESSMENT: 0-10
PAIN_FUNCTIONAL_ASSESSMENT: ACTIVITIES ARE NOT PREVENTED

## 2025-04-07 ASSESSMENT — PAIN DESCRIPTION - DESCRIPTORS
DESCRIPTORS: ACHING
DESCRIPTORS: ACHING;DISCOMFORT
DESCRIPTORS: ACHING
DESCRIPTORS: ACHING

## 2025-04-07 ASSESSMENT — PAIN SCALES - GENERAL
PAINLEVEL_OUTOF10: 9
PAINLEVEL_OUTOF10: 6
PAINLEVEL_OUTOF10: 4
PAINLEVEL_OUTOF10: 5
PAINLEVEL_OUTOF10: 3
PAINLEVEL_OUTOF10: 6
PAINLEVEL_OUTOF10: 4
PAINLEVEL_OUTOF10: 7

## 2025-04-07 NOTE — H&P
input(s): \"PROBNP\", \"TROPHS\" in the last 72 hours.  No results for input(s): \"LABA1C\" in the last 72 hours.  Recent Labs     04/07/25  0500   AST 15   ALT 12   BILITOT 0.7   ALKPHOS 83     No results for input(s): \"INR\", \"LACTA\", \"TSH\" in the last 72 hours.     Lenin Acuna MD

## 2025-04-07 NOTE — ED PROVIDER NOTES
necrosis or crepitus.  I do believe he benefits from IV antibiotics given the failure of outpatient antibiotics.  Urology consulted and awaiting recommendation as well. [MR]      ED Course User Index  [MR] Carlos Guido MD         Vitals:    Vitals:    04/07/25 0449 04/07/25 0543   BP: (!) 182/88 (!) 182/69   Pulse: 83 72   Resp: 20 18   Temp: 98.5 °F (36.9 °C)    TempSrc: Oral    SpO2: 98% 96%        Patient was given the following medications:   Medications   vancomycin (VANCOCIN) 1,000 mg in sodium chloride 0.9 % 250 mL IVPB (Vanu4Avc) (has no administration in time range)   cefepime (MAXIPIME) 1,000 mg in sodium chloride 0.9 % 50 mL IVPB (addEASE) (has no administration in time range)   sodium chloride flush 0.9 % injection 5-40 mL (has no administration in time range)   sodium chloride flush 0.9 % injection 5-40 mL (has no administration in time range)   0.9 % sodium chloride infusion (has no administration in time range)   potassium chloride (KLOR-CON M) extended release tablet 40 mEq (has no administration in time range)     Or   potassium bicarb-citric acid (EFFER-K) effervescent tablet 40 mEq (has no administration in time range)     Or   potassium chloride 10 mEq/100 mL IVPB (Peripheral Line) (has no administration in time range)   magnesium sulfate 2000 mg in 50 mL IVPB premix (has no administration in time range)   enoxaparin (LOVENOX) injection 40 mg (has no administration in time range)   ondansetron (ZOFRAN-ODT) disintegrating tablet 4 mg (has no administration in time range)     Or   ondansetron (ZOFRAN) injection 4 mg (has no administration in time range)   polyethylene glycol (GLYCOLAX) packet 17 g (has no administration in time range)   acetaminophen (TYLENOL) tablet 650 mg (has no administration in time range)     Or   acetaminophen (TYLENOL) suppository 650 mg (has no administration in time range)   lactated ringers infusion (has no administration in time range)   cefepime (MAXIPIME) 1,000

## 2025-04-07 NOTE — ED NOTES
322 @ 4770  
Called [facility] urology @0619  Per Dr. Guido   RE: orchitis  Called again @ 0655  Called the urology group @0716  Urology called back @ 0740 - will see PT on floor  
Patient assisted to bathroom.    
Patient identified as a positive fall risk on the ED triage fall screening.  Patient placed in fall precautions which includes:  yellow fall risk bracelet on wrist and yellow socks on feet. Patient instructed on importance of not getting out of bed or ambulating without assistance for safety.  Pt verbalized understanding.   
Pt given menu to order breakfast per diet orders.  
DIFFERENTIAL - Abnormal; Notable for the following components:       Result Value    WBC 11.2 (*)     Hematocrit 40.0 (*)     Neutrophils Absolute 7.9 (*)     All other components within normal limits   COMPREHENSIVE METABOLIC PANEL W/ REFLEX TO MG FOR LOW K - Abnormal; Notable for the following components:    Glucose 107 (*)     All other components within normal limits   URINALYSIS WITH REFLEX TO CULTURE - Abnormal; Notable for the following components:    Blood, Urine SMALL (*)     Leukocyte Esterase, Urine LARGE (*)     All other components within normal limits   MICROSCOPIC URINALYSIS - Abnormal; Notable for the following components:    WBC, UA >100 (*)     All other components within normal limits     Critical values: no  Intervention for critical value(s):     Abnormal Imaging: yes,  Ultrasound            You may also review the ED PT Care Timeline found under the Summary Tab, ED Encounter Summary, Timeline Reports, ED Patient Care Timeline.     Recommendation    Pending orders/Uncompleted orders to hand off:  home meds, admitting orders    Additional Comments: pt med rec complete  If any further questions, please call Sending RN at [phone]

## 2025-04-07 NOTE — CONSULTS
Urology Consult Note      Reason for Consultation: orchitis     Chief Complaint: \"right sided scrotal pain\"  HPI:  José is a 89 y.o. male with BPH sp greenlight laser vaporization of the prostate in December 2024 with Dr Diego.  He reports 3-4 days of right sided scrotal pain.  He went to urgent care and was given Keflex for UTI on 4/5/25.  His pain worsened and so he came to [facility] ER for eval.  Scrotal US revealed right orchitis and he was admitted for further care'      Past Medical History:   Diagnosis Date    BPH (benign prostatic hyperplasia)     CAD (coronary artery disease)     Colon polyps 1990    hyperplastic    History of PTCA     Hyperlipidemia     Hypertension     Influenza A 01/09/2018    RBBB     Tachycardia     Vertigo     resolved       Past Surgical History:   Procedure Laterality Date    COLONOSCOPY  02/27/90 05/20/05    COLONOSCOPY  7/28/15    polyp    CORONARY ANGIOPLASTY WITH STENT PLACEMENT  2010 2013 2010, 2013 x 2 stents    CYST REMOVAL  1956    back    FINGER SURGERY Right 1946    thumb for infection    HAND SURGERY Left 03/02/2017    EXCISE DUPUYTRENS DISEASE LEFT HAND AND LEFT SMALL FINGER    INTRACAPSULAR CATARACT EXTRACTION Left 4/16/2019    PHACOEMULSIFICATION OF CATARACT LEFT EYE WITH INTRAOCULAR LENS IMPLANT performed by Lazaro Dover MD at [facility] OR    INTRACAPSULAR CATARACT EXTRACTION Right 8/20/2019    PHACOEMULSIFICATION OF CATARACT RIGHT EYE WITH INTRAOCULAR LENS IMPLANT performed by Lazaro Dover MD at [facility] OR    OTHER SURGICAL HISTORY  1955    Anorectal fistula       Medication List reviewed:      Current Facility-Administered Medications   Medication Dose Route Frequency Provider Last Rate Last Admin    sodium chloride flush 0.9 % injection 5-40 mL  5-40 mL IntraVENous 2 times per day Tal Merchant MD        sodium chloride flush 0.9 % injection 5-40 mL  5-40 mL IntraVENous PRN Tal Merchant MD        0.9 % sodium chloride infusion   IntraVENous

## 2025-04-07 NOTE — CONSULTS
Pharmacy Note  Vancomycin Consult    José Gonzalez is a 89 y.o. male started on Vancomycin for Skin/Soft Tissue infection for a duration of 5 days; consult received from Dr. Merchant to manage therapy. Also receiving the following antibiotics: cefepime.    Allergies:  Levaquin  [levofloxacin in d5w]     Tmax: 98.5  Recent Labs     04/07/25  0500   CREATININE 1.1     Recent Labs     04/07/25  0500   WBC 11.2*     Estimated Creatinine Clearance: 44 mL/min (based on SCr of 1.1 mg/dL).  No intake or output data in the 24 hours ending 04/07/25 0642  Wt Readings from Last 1 Encounters:   04/07/25 70.4 kg (155 lb 3.2 oz)       Body mass index is 23.6 kg/m².    Date Culture Results   4/7 Urine ordered, yet to be drawn     Goal Vancomycin AUC: 400-600     Assessment/Plan:  Will initiate Vancomycin with a one-time loading dose of 1500 mg x1, followed by 500 mg IV every 12 hours.   Calculated AUC: 464 mg/L.hr Predicted Trough: 16.1 mcg/L     Vancomycin level ordered for 4/8 at 0500.   Timing of vancomycin levels will be determined based on culture results, renal function, and clinical response.     Thank you for the consult,  Michelle Pérez, PharmD 4/7/2025 6:42 AM

## 2025-04-08 VITALS
BODY MASS INDEX: 23.52 KG/M2 | HEIGHT: 68 IN | WEIGHT: 155.2 LBS | HEART RATE: 64 BPM | RESPIRATION RATE: 17 BRPM | SYSTOLIC BLOOD PRESSURE: 165 MMHG | DIASTOLIC BLOOD PRESSURE: 58 MMHG | OXYGEN SATURATION: 95 % | TEMPERATURE: 98.1 F

## 2025-04-08 LAB
ANION GAP SERPL CALCULATED.3IONS-SCNC: 10 MMOL/L (ref 3–16)
BASOPHILS # BLD: 0 K/UL (ref 0–0.2)
BASOPHILS NFR BLD: 0.2 %
BUN SERPL-MCNC: 19 MG/DL (ref 7–20)
CALCIUM SERPL-MCNC: 8 MG/DL (ref 8.3–10.6)
CHLORIDE SERPL-SCNC: 108 MMOL/L (ref 99–110)
CO2 SERPL-SCNC: 23 MMOL/L (ref 21–32)
CREAT SERPL-MCNC: 1.3 MG/DL (ref 0.8–1.3)
DEPRECATED RDW RBC AUTO: 14 % (ref 12.4–15.4)
EOSINOPHIL # BLD: 0.1 K/UL (ref 0–0.6)
EOSINOPHIL NFR BLD: 1.6 %
GFR SERPLBLD CREATININE-BSD FMLA CKD-EPI: 52 ML/MIN/{1.73_M2}
GLUCOSE SERPL-MCNC: 90 MG/DL (ref 70–99)
HCT VFR BLD AUTO: 35.1 % (ref 40.5–52.5)
HGB BLD-MCNC: 12 G/DL (ref 13.5–17.5)
LYMPHOCYTES # BLD: 2.7 K/UL (ref 1–5.1)
LYMPHOCYTES NFR BLD: 36.8 %
MCH RBC QN AUTO: 31.2 PG (ref 26–34)
MCHC RBC AUTO-ENTMCNC: 34.2 G/DL (ref 31–36)
MCV RBC AUTO: 91.2 FL (ref 80–100)
MONOCYTES # BLD: 0.9 K/UL (ref 0–1.3)
MONOCYTES NFR BLD: 12.3 %
NEUTROPHILS # BLD: 3.7 K/UL (ref 1.7–7.7)
NEUTROPHILS NFR BLD: 49.1 %
PLATELET # BLD AUTO: 115 K/UL (ref 135–450)
PMV BLD AUTO: 8.1 FL (ref 5–10.5)
POTASSIUM SERPL-SCNC: 4 MMOL/L (ref 3.5–5.1)
POTASSIUM SERPL-SCNC: 4 MMOL/L (ref 3.5–5.1)
RBC # BLD AUTO: 3.86 M/UL (ref 4.2–5.9)
SODIUM SERPL-SCNC: 141 MMOL/L (ref 136–145)
WBC # BLD AUTO: 7.4 K/UL (ref 4–11)

## 2025-04-08 PROCEDURE — 80048 BASIC METABOLIC PNL TOTAL CA: CPT

## 2025-04-08 PROCEDURE — 85025 COMPLETE CBC W/AUTO DIFF WBC: CPT

## 2025-04-08 PROCEDURE — 6370000000 HC RX 637 (ALT 250 FOR IP)

## 2025-04-08 PROCEDURE — 36415 COLL VENOUS BLD VENIPUNCTURE: CPT

## 2025-04-08 RX ORDER — IBUPROFEN 600 MG/1
600 TABLET, FILM COATED ORAL EVERY 8 HOURS PRN
Qty: 21 TABLET | Refills: 0 | Status: SHIPPED | OUTPATIENT
Start: 2025-04-08 | End: 2025-04-15

## 2025-04-08 RX ORDER — CARVEDILOL 6.25 MG/1
6.25 TABLET ORAL 2 TIMES DAILY WITH MEALS
Status: DISCONTINUED | OUTPATIENT
Start: 2025-04-08 | End: 2025-04-08 | Stop reason: HOSPADM

## 2025-04-08 RX ORDER — AMLODIPINE BESYLATE 5 MG/1
5 TABLET ORAL DAILY
Qty: 30 TABLET | Refills: 3 | Status: SHIPPED | OUTPATIENT
Start: 2025-04-09

## 2025-04-08 RX ORDER — DOXYCYCLINE HYCLATE 100 MG
100 TABLET ORAL EVERY 12 HOURS SCHEDULED
Qty: 18 TABLET | Refills: 0 | Status: SHIPPED | OUTPATIENT
Start: 2025-04-08 | End: 2025-04-17

## 2025-04-08 RX ADMIN — Medication 1 TABLET: at 08:33

## 2025-04-08 RX ADMIN — RIVAROXABAN 15 MG: 15 TABLET, FILM COATED ORAL at 08:33

## 2025-04-08 RX ADMIN — DOXYCYCLINE HYCLATE 100 MG: 100 TABLET, COATED ORAL at 08:33

## 2025-04-08 RX ADMIN — LISINOPRIL 20 MG: 20 TABLET ORAL at 08:33

## 2025-04-08 RX ADMIN — AMLODIPINE BESYLATE 5 MG: 5 TABLET ORAL at 08:33

## 2025-04-08 RX ADMIN — PANTOPRAZOLE SODIUM 40 MG: 40 TABLET, DELAYED RELEASE ORAL at 06:19

## 2025-04-08 RX ADMIN — CARVEDILOL 6.25 MG: 6.25 TABLET, FILM COATED ORAL at 08:33

## 2025-04-08 ASSESSMENT — PAIN - FUNCTIONAL ASSESSMENT
PAIN_FUNCTIONAL_ASSESSMENT: ACTIVITIES ARE NOT PREVENTED
PAIN_FUNCTIONAL_ASSESSMENT: ACTIVITIES ARE NOT PREVENTED

## 2025-04-08 ASSESSMENT — PAIN SCALES - GENERAL
PAINLEVEL_OUTOF10: 2
PAINLEVEL_OUTOF10: 3
PAINLEVEL_OUTOF10: 2

## 2025-04-08 ASSESSMENT — PAIN DESCRIPTION - ORIENTATION
ORIENTATION: RIGHT
ORIENTATION: RIGHT

## 2025-04-08 ASSESSMENT — PAIN DESCRIPTION - LOCATION
LOCATION: ABDOMEN
LOCATION: GROIN

## 2025-04-08 ASSESSMENT — PAIN DESCRIPTION - DESCRIPTORS
DESCRIPTORS: ACHING
DESCRIPTORS: ACHING

## 2025-04-08 NOTE — DISCHARGE INSTR - COC
Continuity of Care Form    Patient Name: José Gonzalez   :  1935  MRN:  [id number]    Admit date:  2025  Discharge date:  ***    Code Status Order: Full Code   Advance Directives:     Admitting Physician:  Tal Merchant MD  PCP: Alex Booth, APRN - CNP    Discharging Nurse: ***  Discharging Hospital Unit/Room#: 0322/0322-01  Discharging Unit Phone Number: ***    Emergency Contact:   Extended Emergency Contact Information  Primary Emergency Contact: Keeley Mcrae  Home Phone: [phone]  Mobile Phone: [phone]  Relation: Child  Secondary Emergency Contact: Cait Anderson  Home Phone: [phone]  Mobile Phone: [phone]  Relation: Child    Past Surgical History:  Past Surgical History:   Procedure Laterality Date    COLONOSCOPY  90    COLONOSCOPY  7/28/15    polyp    CORONARY ANGIOPLASTY WITH STENT PLACEMENT  2010, 2013 x 2 stents    CYST REMOVAL      back    FINGER SURGERY Right 194    thumb for infection    HAND SURGERY Left 2017    EXCISE DUPUYTRENS DISEASE LEFT HAND AND LEFT SMALL FINGER    INTRACAPSULAR CATARACT EXTRACTION Left 2019    PHACOEMULSIFICATION OF CATARACT LEFT EYE WITH INTRAOCULAR LENS IMPLANT performed by Lazaro Dover MD at [facility] OR    INTRACAPSULAR CATARACT EXTRACTION Right 2019    PHACOEMULSIFICATION OF CATARACT RIGHT EYE WITH INTRAOCULAR LENS IMPLANT performed by Lazaro Dover MD at [facility] OR    OTHER SURGICAL HISTORY      Anorectal fistula       Immunization History:   Immunization History   Administered Date(s) Administered    COVID-19, PFIZER PURPLE top, DILUTE for use, (age 12 y+), 30mcg/0.3mL 2021, 2021, 2021    Influenza 10/11/2013    Influenza Virus Vaccine 2008, 10/16/2009, 10/09/2010, 10/11/2011    Influenza, FLUAD, (age 65 y+), IM, Quadv, 0.5mL 10/05/2020, 10/22/2021, 10/21/2022, 10/30/2023    Influenza, FLUAD, (age 65 y+), IM, Trivalent PF, 0.5mL 10/18/2019, 2024

## 2025-04-08 NOTE — PROGRESS NOTES
4 Eyes Skin Assessment     NAME:  José Gonzalez  YOB: 1935  MEDICAL RECORD NUMBER:  [id number]    The patient is being assessed for  Admission    I agree that at least one RN has performed a thorough Head to Toe Skin Assessment on the patient. ALL assessment sites listed below have been assessed.      Areas assessed by both nurses: Elizabeth RN, Amna RN    Head, Face, Ears, Shoulders, Back, Chest, Arms, Elbows, Hands, Sacrum. Buttock, Coccyx, Ischium, and Legs. Feet and Heels        Does the Patient have a Wound? No noted wound(s)       Efrain Prevention initiated by RN: No  Wound Care Orders initiated by RN: No    Pressure Injury (Stage 3,4, Unstageable, DTI, NWPT, and Complex wounds) if present, place Wound referral order by RN under : No    New Ostomies, if present place, Ostomy referral order under : Yes     Nurse 1 eSignature: Electronically signed by Giuliana Jackson RN on 4/7/25 at 10:02 AM EDT    **SHARE this note so that the co-signing nurse can place an eSignature**    Nurse 2 eSignature: {Esignature:[id number]}   
Awake on bed, alert and oriented x 4. Telemetry in place. IV on left AC. Reports testicular pain 6/10. Side rails x 2. Call light within reach. Will continue to montor.  
Discharge instructions reviewed with pt and daughter. Reviewed all new meds and follow up appts. Pt verbalized understanding. Daughter to  meds from outpt pharmacy.  
New admission from ER, right testical swelling. Pt went to urgent care this past weekend and was put on antibiotics but the swelling and pain continued. Pt alert and oriented. Oriented to room, call light and staff. Pt reports pain tolerable. Independent in room. Daughter at bedside. Waiting for urology consult.  
Results from UA on front of the chart from when he went to urgent care.  
  Urine Culture: p        Antibiotic Therapy: vanc and cefepime      Imaging:   US SCROTUM W COMPLETE DUPLEX   Final Result   Impression:        1. Mild heterogeneity of the right testicle is slightly increased flow lower pole, small right hydrocele, and slight increased flow of the right scrotal wall. Constellation of findings likely indicates orchitis and cellulitis. Recommend clinical    correlation and short-term interval follow-up.       Electronically signed by Kody Ramires MD                Impression/Plan:      #Right orchitis   #BPH with LUTS   - continue antibiotics per IM.  His urine culture from admission is still pending, but I suspect this will be negative considering his use of oral antibiotics before this sample was obtained.  I recommend requesting the urine culture from [facility] to aide in antibiotic choice.    - OK for dc on oral antibiotics when medically ready from uro perspective  - discussed with hospitalist Dr Tello  - he will f/u with Dr Diego   - call with questions, signing off       Sarahi Olsen PA-C  [facility]

## 2025-04-08 NOTE — PLAN OF CARE
Problem: Safety - Adult  Goal: Free from fall injury  4/8/2025 1139 by Giuliana Jackson RN  Outcome: Adequate for Discharge  4/8/2025 0858 by Giuliana Jackson RN  Outcome: Progressing     Problem: Pain  Goal: Verbalizes/displays adequate comfort level or baseline comfort level  4/8/2025 1139 by Giuliana Jackson RN  Outcome: Adequate for Discharge  4/8/2025 0858 by Giuliana Jackson RN  Outcome: Progressing     Problem: ABCDS Injury Assessment  Goal: Absence of physical injury  4/8/2025 1139 by Giuliana Jackson RN  Outcome: Adequate for Discharge  4/8/2025 0858 by Giuliana Jackson RN  Outcome: Progressing

## 2025-04-08 NOTE — PLAN OF CARE
Problem: Safety - Adult  Goal: Free from fall injury  Outcome: Progressing     Problem: Pain  Goal: Verbalizes/displays adequate comfort level or baseline comfort level  4/8/2025 0858 by Giuliana Jackson RN  Outcome: Progressing  4/7/2025 2125 by Yris Ta, RN  Flowsheets (Taken 4/7/2025 2125)  Verbalizes/displays adequate comfort level or baseline comfort level:   Administer analgesics based on type and severity of pain and evaluate response   Consider cultural and social influences on pain and pain management   Assess pain using appropriate pain scale   Implement non-pharmacological measures as appropriate and evaluate response     Problem: ABCDS Injury Assessment  Goal: Absence of physical injury  Outcome: Progressing

## 2025-04-08 NOTE — PLAN OF CARE
Problem: Pain  Goal: Verbalizes/displays adequate comfort level or baseline comfort level  4/7/2025 2125 by Yris Ta RN  Flowsheets (Taken 4/7/2025 2125)  Verbalizes/displays adequate comfort level or baseline comfort level:   Administer analgesics based on type and severity of pain and evaluate response   Consider cultural and social influences on pain and pain management   Assess pain using appropriate pain scale   Implement non-pharmacological measures as appropriate and evaluate response  4/7/2025 0947 by Giuliana Jackson RN  Outcome: Progressing

## 2025-04-08 NOTE — CARE COORDINATION
CASE MANAGEMENT DISCHARGE SUMMARY    Discharge to: Home with PRN family support and oral ATB    IMM given: (date) 04/07/2025    Transportation: via private car    Confirmed discharge plan with: Patient and dtr bedside     RN, name: JAYA Johnson     .JAYSON Dee

## 2025-04-09 ENCOUNTER — TELEPHONE (OUTPATIENT)
Dept: INTERNAL MEDICINE CLINIC | Age: 89
End: 2025-04-09

## 2025-04-09 LAB
BACTERIA UR CULT: ABNORMAL
ORGANISM: ABNORMAL

## 2025-04-09 NOTE — TELEPHONE ENCOUNTER
Care Transitions Initial Follow Up Call    Call within 2 business days of discharge: Yes     Patient: José EARL Carlos Patient : 1935 MRN: [id number]    Rabia Obando MA

## 2025-04-10 NOTE — TELEPHONE ENCOUNTER
Care Transitions Initial Follow Up Call    Outreach made within 2 business days of discharge: Yes    Patient: José Gonzalez Patient : 1935   MRN: [id number]  Reason for Admission: CELLULITIS OF SCROTUM  Discharge Date: 25       Spoke with: PATIENT    Discharge department/facility: HOME    TCM Interactive Patient Contact:  Was patient able to fill all prescriptions: Yes  Was patient instructed to bring all medications to the follow-up visit: Yes  Is patient taking all medications as directed in the discharge summary? Yes  Does patient understand their discharge instructions: Yes  Does patient have questions or concerns that need addressed prior to 7-14 day follow up office visit: no    Additional needs identified to be addressed with provider  No needs identified             Scheduled appointment with PCP within 7-14 days    Follow Up  No future appointments.    Emma Linares LPN

## 2025-04-14 ENCOUNTER — OFFICE VISIT (OUTPATIENT)
Dept: INTERNAL MEDICINE CLINIC | Age: 89
End: 2025-04-14

## 2025-04-14 VITALS
OXYGEN SATURATION: 97 % | DIASTOLIC BLOOD PRESSURE: 88 MMHG | HEART RATE: 77 BPM | WEIGHT: 150 LBS | SYSTOLIC BLOOD PRESSURE: 139 MMHG | BODY MASS INDEX: 22.81 KG/M2

## 2025-04-14 DIAGNOSIS — I25.10 CAD IN NATIVE ARTERY: ICD-10-CM

## 2025-04-14 DIAGNOSIS — E78.5 DYSLIPIDEMIA: ICD-10-CM

## 2025-04-14 DIAGNOSIS — N45.2 ORCHITIS: ICD-10-CM

## 2025-04-14 DIAGNOSIS — Z09 HOSPITAL DISCHARGE FOLLOW-UP: Primary | ICD-10-CM

## 2025-04-14 RX ORDER — TADALAFIL 10 MG/1
10 TABLET ORAL DAILY
Qty: 30 TABLET | Refills: 5
Start: 2025-04-14

## 2025-04-14 ASSESSMENT — PATIENT HEALTH QUESTIONNAIRE - PHQ9
SUM OF ALL RESPONSES TO PHQ QUESTIONS 1-9: 0
1. LITTLE INTEREST OR PLEASURE IN DOING THINGS: NOT AT ALL
SUM OF ALL RESPONSES TO PHQ QUESTIONS 1-9: 0
SUM OF ALL RESPONSES TO PHQ QUESTIONS 1-9: 0
2. FEELING DOWN, DEPRESSED OR HOPELESS: NOT AT ALL
SUM OF ALL RESPONSES TO PHQ QUESTIONS 1-9: 0

## 2025-04-14 NOTE — PROGRESS NOTES
Post-Discharge Transitional Care Follow Up      José Gonzalez   YOB: 1935    Date of Office Visit:  4/14/2025  Date of Hospital Admission: 4/7/25  Date of Hospital Discharge: 4/8/25  Readmission Risk Score (high >=14%. Medium >=10%):Readmission Risk Score: 10.3      Care management risk score Rising risk (score 2-5) and Complex Care (Scores >=6): No Risk Score On File     Non face to face  following discharge, date last encounter closed (first attempt may have been earlier): 04/09/2025     Call initiated 2 business days of discharge: Yes     Hospital discharge follow-up  -     [facility] DISCHARGE MEDS RECONCILED W/ CURRENT OUTPATIENT MED LIST    Orchitis  -- Monitor closely. If any pain persists, refer to Urology ([place])     CAD in native artery  Dyslipidemia      Medical Decision Making: moderate complexity  Return in about 6 months (around 10/14/2025) for Medicare wellness.    On this date 4/14/2025 I have spent 35 minutes reviewing previous notes, test results and face to face with the patient discussing the diagnosis and importance of compliance with the treatment plan as well as documenting on the day of the visit.   -    Subjective:   HPI    2 daughters in [place], meets with them every week. Talks to son in [place] once a week as well.     Hx Atrial Tachycardia, f/u Cardiology 3/2025. Darius/Tara.     CKD Stage 3. July 2025, f/u. Renal US 4/2025. Clarence.    Recent URI symptoms March 10th.     Groin/testicular pain. UTI treatment Keflex by urgent care. Pain got worse and he went to ED>>>  2-3 days left of Doxycycline, 1 out of 10 pain which is MUCH improved. He was able to exercise today.     Dr Diego perform Green Laser therapy 12/2024, TURP-like procedure per patient. No concerns since the tx until 4/2025.   F/u June 30th      Inpatient course: Discharge summary reviewed- see chart.    Interval history/Current status: Good    Patient Active Problem List   Diagnosis    RBBB    History of PTCA    CAD in

## 2025-04-22 ENCOUNTER — TELEPHONE (OUTPATIENT)
Dept: INTERNAL MEDICINE CLINIC | Age: 89
End: 2025-04-22

## 2025-04-22 NOTE — TELEPHONE ENCOUNTER
[facility] [place] [facility] [place]22/[place]. For status update, call [phone] option 1.

## 2025-07-07 LAB
ALBUMIN: 3.9 G/DL (ref 3.5–5)
ANION GAP SERPL CALCULATED.3IONS-SCNC: 5 MMOL/L (ref 5–13)
BACTERIA: ABNORMAL /HPF
BILIRUBIN, URINE: NEGATIVE
BLOOD, URINE: ABNORMAL
BUN / CREAT RATIO: 21
BUN BLDV-MCNC: 24 MG/DL (ref 7–25)
CALCIUM SERPL-MCNC: 8.3 MG/DL (ref 8.5–10.5)
CHLORIDE BLD-SCNC: 107 MMOL/L (ref 98–110)
CLARITY, UA: ABNORMAL
CO2: 23 MMOL/L (ref 22–29)
COLOR, UA: YELLOW
CREAT SERPL-MCNC: 1.12 MG/DL (ref 0.5–1.3)
EGFR (CKD-EPI): 63 SEE NOTE
GLUCOSE BLD-MCNC: 115 MG/DL (ref 71–99)
GLUCOSE URINE: NEGATIVE MG/DL
HCT VFR BLD CALC: 41.8 % (ref 40–51)
HEMOGLOBIN: 13.5 G/DL (ref 13.2–17.1)
KETONES, URINE: NEGATIVE MG/DL
LEUKOCYTE CLUMPS, URINE: ABNORMAL /HPF
LEUKOCYTE ESTERASE, URINE: ABNORMAL
LEUKOCYTES, BLD: 9.2 10*3/UL (ref 4–12)
LEUKOCYTES, UA: >100 /HPF (ref 0–5)
MCH RBC QN AUTO: 29.9 PG (ref 27–33)
MCHC RBC AUTO-ENTMCNC: 32.3 G/DL (ref 30–36)
MCV RBC AUTO: 92.5 FL (ref 80–100)
NITRITE, URINE: NEGATIVE
PDW BLD-RTO: 13.1 % (ref 11–15)
PH, URINE: 6.5 (ref 5–8)
PHOSPHORUS: 3 MG/DL (ref 2.1–4.3)
PLATELET # BLD: 183 10*3/UL (ref 140–400)
PMV BLD AUTO: 10 FL (ref 9–13)
POTASSIUM SERPL-SCNC: 4.6 MMOL/L (ref 3.5–5.1)
PROTEIN UA: 30 MG/DL
RBC # BLD: 4.52 10*6/UL (ref 4.2–5.8)
RBC UA: 44 /HPF (ref 0–3)
SODIUM BLD-SCNC: 135 MMOL/L (ref 135–146)
SPECIFIC GRAVITY UA: 1.01 (ref 1–1.03)
UROBILINOGEN, URINE: <2 MG/DL

## 2025-09-01 ENCOUNTER — HOSPITAL ENCOUNTER (EMERGENCY)
Age: 89
Discharge: HOME | End: 2025-09-01
Attending: EMERGENCY MEDICINE
Payer: MEDICARE

## 2025-09-01 ENCOUNTER — APPOINTMENT (OUTPATIENT)
Dept: GENERAL RADIOLOGY | Age: 89
End: 2025-09-01
Payer: MEDICARE

## 2025-09-01 VITALS
RESPIRATION RATE: 16 BRPM | HEIGHT: 68 IN | SYSTOLIC BLOOD PRESSURE: 170 MMHG | BODY MASS INDEX: 24.25 KG/M2 | OXYGEN SATURATION: 94 % | WEIGHT: 160 LBS | HEART RATE: 66 BPM | DIASTOLIC BLOOD PRESSURE: 75 MMHG | TEMPERATURE: 98.5 F

## 2025-09-01 DIAGNOSIS — M25.512 LEFT SHOULDER PAIN, UNSPECIFIED CHRONICITY: Primary | ICD-10-CM

## 2025-09-01 DIAGNOSIS — I10 HYPERTENSION, UNSPECIFIED TYPE: ICD-10-CM

## 2025-09-01 LAB
ALBUMIN SERPL-MCNC: 4.2 G/DL (ref 3.4–5)
ALBUMIN/GLOB SERPL: 1.4 {RATIO} (ref 1.1–2.2)
ALP SERPL-CCNC: 98 U/L (ref 40–129)
ALT SERPL-CCNC: 9 U/L (ref 10–40)
ALT SERPL-CCNC: ABNORMAL U/L (ref 10–40)
ANION GAP SERPL CALCULATED.3IONS-SCNC: 13 MMOL/L (ref 3–16)
AST SERPL-CCNC: 30 U/L (ref 15–37)
BASOPHILS # BLD: 0.1 K/UL (ref 0–0.2)
BASOPHILS NFR BLD: 0.6 %
BILIRUB SERPL-MCNC: 0.3 MG/DL (ref 0–1)
BUN SERPL-MCNC: 24 MG/DL (ref 7–20)
CALCIUM SERPL-MCNC: 9.2 MG/DL (ref 8.3–10.6)
CHLORIDE SERPL-SCNC: 105 MMOL/L (ref 99–110)
CO2 SERPL-SCNC: 21 MMOL/L (ref 21–32)
CREAT SERPL-MCNC: 1.4 MG/DL (ref 0.8–1.3)
DEPRECATED RDW RBC AUTO: 13.1 % (ref 12.4–15.4)
EOSINOPHIL # BLD: 0.1 K/UL (ref 0–0.6)
EOSINOPHIL NFR BLD: 1.3 %
GFR SERPLBLD CREATININE-BSD FMLA CKD-EPI: 48 ML/MIN/{1.73_M2}
GLUCOSE SERPL-MCNC: 119 MG/DL (ref 70–99)
HCT VFR BLD AUTO: 41.5 % (ref 40.5–52.5)
HGB BLD-MCNC: 13.9 G/DL (ref 13.5–17.5)
LYMPHOCYTES # BLD: 2.3 K/UL (ref 1–5.1)
LYMPHOCYTES NFR BLD: 27.3 %
MCH RBC QN AUTO: 30.1 PG (ref 26–34)
MCHC RBC AUTO-ENTMCNC: 33.5 G/DL (ref 31–36)
MCV RBC AUTO: 89.8 FL (ref 80–100)
MONOCYTES # BLD: 0.6 K/UL (ref 0–1.3)
MONOCYTES NFR BLD: 7.2 %
NEUTROPHILS # BLD: 5.4 K/UL (ref 1.7–7.7)
NEUTROPHILS NFR BLD: 63.6 %
PLATELET # BLD AUTO: 189 K/UL (ref 135–450)
PMV BLD AUTO: 7.4 FL (ref 5–10.5)
POTASSIUM SERPL-SCNC: 4.2 MMOL/L (ref 3.5–5.1)
POTASSIUM SERPL-SCNC: ABNORMAL MMOL/L (ref 3.5–5.1)
PROT SERPL-MCNC: 7.1 G/DL (ref 6.4–8.2)
RBC # BLD AUTO: 4.62 M/UL (ref 4.2–5.9)
SODIUM SERPL-SCNC: 139 MMOL/L (ref 136–145)
TROPONIN, HIGH SENSITIVITY: 8 NG/L (ref 0–22)
WBC # BLD AUTO: 8.5 K/UL (ref 4–11)

## 2025-09-01 PROCEDURE — 99285 EMERGENCY DEPT VISIT HI MDM: CPT

## 2025-09-01 PROCEDURE — 71045 X-RAY EXAM CHEST 1 VIEW: CPT

## 2025-09-01 PROCEDURE — 93005 ELECTROCARDIOGRAM TRACING: CPT | Performed by: EMERGENCY MEDICINE

## 2025-09-01 PROCEDURE — 36415 COLL VENOUS BLD VENIPUNCTURE: CPT

## 2025-09-01 PROCEDURE — 84484 ASSAY OF TROPONIN QUANT: CPT

## 2025-09-01 PROCEDURE — 80053 COMPREHEN METABOLIC PANEL: CPT

## 2025-09-01 PROCEDURE — 6370000000 HC RX 637 (ALT 250 FOR IP): Performed by: EMERGENCY MEDICINE

## 2025-09-01 PROCEDURE — 85025 COMPLETE CBC W/AUTO DIFF WBC: CPT

## 2025-09-01 RX ORDER — ACETAMINOPHEN 500 MG
1000 TABLET ORAL ONCE
Status: COMPLETED | OUTPATIENT
Start: 2025-09-01 | End: 2025-09-01

## 2025-09-01 RX ADMIN — ACETAMINOPHEN 1000 MG: 500 TABLET ORAL at 23:20

## 2025-09-01 ASSESSMENT — PAIN - FUNCTIONAL ASSESSMENT: PAIN_FUNCTIONAL_ASSESSMENT: 0-10

## 2025-09-01 ASSESSMENT — PAIN SCALES - GENERAL: PAINLEVEL_OUTOF10: 0

## 2025-09-02 LAB
EKG ATRIAL RATE: 66 BPM
EKG DIAGNOSIS: NORMAL
EKG P AXIS: 52 DEGREES
EKG P-R INTERVAL: 160 MS
EKG Q-T INTERVAL: 412 MS
EKG QRS DURATION: 140 MS
EKG QTC CALCULATION (BAZETT): 431 MS
EKG R AXIS: -15 DEGREES
EKG T AXIS: 33 DEGREES
EKG VENTRICULAR RATE: 66 BPM

## 2025-09-04 ENCOUNTER — OFFICE VISIT (OUTPATIENT)
Dept: ORTHOPEDIC SURGERY | Age: 89
End: 2025-09-04

## 2025-09-04 VITALS — WEIGHT: 160 LBS | BODY MASS INDEX: 24.25 KG/M2 | HEIGHT: 68 IN

## 2025-09-04 DIAGNOSIS — R52 PAIN: Primary | ICD-10-CM

## 2025-09-04 DIAGNOSIS — M75.102 TEAR OF LEFT ROTATOR CUFF, UNSPECIFIED TEAR EXTENT, UNSPECIFIED WHETHER TRAUMATIC: ICD-10-CM

## 2025-09-04 RX ORDER — BUPIVACAINE HYDROCHLORIDE 2.5 MG/ML
30 INJECTION, SOLUTION INFILTRATION; PERINEURAL ONCE
Status: COMPLETED | OUTPATIENT
Start: 2025-09-04 | End: 2025-09-04

## 2025-09-04 RX ORDER — TRIAMCINOLONE ACETONIDE 40 MG/ML
40 INJECTION, SUSPENSION INTRA-ARTICULAR; INTRAMUSCULAR ONCE
Status: COMPLETED | OUTPATIENT
Start: 2025-09-04 | End: 2025-09-04

## 2025-09-04 RX ORDER — LIDOCAINE HYDROCHLORIDE 10 MG/ML
5 INJECTION, SOLUTION INFILTRATION; PERINEURAL ONCE
Status: COMPLETED | OUTPATIENT
Start: 2025-09-04 | End: 2025-09-04

## 2025-09-04 RX ADMIN — LIDOCAINE HYDROCHLORIDE 5 ML: 10 INJECTION, SOLUTION INFILTRATION; PERINEURAL at 15:06

## 2025-09-04 RX ADMIN — BUPIVACAINE HYDROCHLORIDE 75 MG: 2.5 INJECTION, SOLUTION INFILTRATION; PERINEURAL at 15:03

## 2025-09-04 RX ADMIN — TRIAMCINOLONE ACETONIDE 40 MG: 40 INJECTION, SUSPENSION INTRA-ARTICULAR; INTRAMUSCULAR at 15:06

## (undated) DEVICE — Device

## (undated) DEVICE — SILICONE I/A TIP STRAIGHT: Brand: ALCON

## (undated) DEVICE — ELECTRODE ECG MONITR FOAM TEAR DROP ADLT RED

## (undated) DEVICE — GLOVE,SURG,SENSICARE,ALOE,LF,PF,7: Brand: MEDLINE

## (undated) DEVICE — SOLUTION IV 1000ML LAC RINGERS PH 6.5 INJ USP VIAFLX PLAS

## (undated) DEVICE — AIRLIFE™ NASAL OXYGEN CANNULA CURVED, FLARED TIP, WITH 7 FEET (2.1 M) CRUSH RESISTANT TUBING, OVER-THE-EAR STYLE: Brand: AIRLIFE™

## (undated) DEVICE — SURGICAL PROCEDURE PACK EYE ANDRSN

## (undated) DEVICE — 3M™ TEGADERM™ TRANSPARENT FILM DRESSING FRAME STYLE, 1624W, 2-3/8 IN X 2-3/4 IN (6 CM X 7 CM), 100/CT 4CT/CASE: Brand: 3M™ TEGADERM™

## (undated) DEVICE — TOWEL,OR,DSP,ST,BLUE,STD,4/PK,20PK/CS: Brand: MEDLINE

## (undated) DEVICE — GLOVE SURG SZ 8 L12IN FNGR THK94MIL STD WHT LTX FREE

## (undated) DEVICE — PATIENT CARE KIT EYE POST OP

## (undated) DEVICE — GLOVE SURG SZ 65 L12IN FNGR THK94MIL STD WHT LTX FREE

## (undated) DEVICE — CATHETER IV 20GA L1.25IN PNK FEP SFTY STR HUB RADPQ DISP

## (undated) DEVICE — 3M(TM) TRANSPORE SURGICAL TAPE 1527-1: Brand: 3M™ TRANSPORE™

## (undated) DEVICE — NEEDLE FLTR 19GA L1.5IN WALL THK5UM BRN POLYPR HUB S STL

## (undated) DEVICE — SYRINGE TB 1ML NDL 27GA L0.5IN PLAS SLIP TIP CONVENTIONAL

## (undated) DEVICE — MICROSURGICAL INSTRUMENT ANTERIOR CHAMBER CANNULA 27GA: Brand: ALCON

## (undated) DEVICE — SET GRAV VENT NVENT CK VLV 3 NDL FREE PRT 10 GTT

## (undated) DEVICE — THE MONARCH® "D" CARTRIDGE IS A SINGLE-USE POLYPROPYLENE CARTRIDGE FOR POSTERIOR CHAMBER IOL DELIVERY: Brand: MONARCH® III

## (undated) DEVICE — NEEDLE HYPO 18GA L1IN PNK POLYPR HUB S STL REG BVL STR W/O

## (undated) DEVICE — SET ADMIN PRIMING 7ML L30IN 7.35LB 20 GTT 2ND RLER CLMP